# Patient Record
Sex: FEMALE | Race: OTHER | Employment: FULL TIME | URBAN - METROPOLITAN AREA
[De-identification: names, ages, dates, MRNs, and addresses within clinical notes are randomized per-mention and may not be internally consistent; named-entity substitution may affect disease eponyms.]

---

## 2018-03-20 ENCOUNTER — OFFICE VISIT (OUTPATIENT)
Dept: FAMILY MEDICINE CLINIC | Facility: CLINIC | Age: 28
End: 2018-03-20

## 2018-03-20 VITALS
BODY MASS INDEX: 26.22 KG/M2 | WEIGHT: 148 LBS | TEMPERATURE: 98 F | HEART RATE: 84 BPM | HEIGHT: 63 IN | RESPIRATION RATE: 12 BRPM | DIASTOLIC BLOOD PRESSURE: 70 MMHG | SYSTOLIC BLOOD PRESSURE: 102 MMHG | OXYGEN SATURATION: 98 %

## 2018-03-20 DIAGNOSIS — A08.4 VIRAL GASTROENTERITIS: Primary | ICD-10-CM

## 2018-03-20 DIAGNOSIS — J06.9 VIRAL URI: ICD-10-CM

## 2018-03-20 PROCEDURE — 99202 OFFICE O/P NEW SF 15 MIN: CPT | Performed by: NURSE PRACTITIONER

## 2018-03-20 NOTE — PATIENT INSTRUCTIONS
· May take Pepto-bismol or Imodium or Lomotil to slow diarrhea and minimize fluid loss through colon. · Rest as much as possible  · Try to drink lots of fluids. Start with small sips of water every 15 minutes as long as you can keep fluid down.     · Avoi stools  The danger from repeated diarrhea is dehydration.  Dehydration is the loss of too much water and other fluids from the body without taking in enough to replace what is lost.  Antibiotics are not effective in this illness, but there are a number of t good choice. They have too much sugar and not enough electrolytes. In this case, commercially available products called oral rehydration solutions are best.  · Caffeine, tobacco, and alcohol can make the diarrhea, cramping, and pain worse.   · Do not force to seek medical advice  Call your healthcare provider right away if any of the following occur:  · Increasing abdominal pain or constant lower right abdominal pain  · Continued vomiting (unable to keep liquids down)  · Frequent diarrhea (more than 5 times

## 2018-03-20 NOTE — PROGRESS NOTES
CHIEF COMPLAINT:   Patient presents with:  Cough  Diarrhea  Nausea      HPI:   Mahamed Lopez is a 29year old female who presents for complaints of nausea and diarrhea for 2 days. Reports generalized mild cramping abdominal pain.   Reports no vomiting, /70 (BP Location: Right arm, Patient Position: Sitting, Cuff Size: adult)   Pulse 84   Temp 98.4 °F (36.9 °C) (Oral)   Resp 12   Ht 63\"   Wt 148 lb   LMP 02/20/2018 (Exact Date)   SpO2 98%   BMI 26.22 kg/m²   GENERAL: well developed, well nourished, · Try to drink lots of fluids. Start with small sips of water every 15 minutes as long as you can keep fluid down. · Avoid fruit juices and soda. Try water, Pedialyte, and/or 1/2 strength Gatorade/Power Aid. Drink 1-2 oz. Every hour when tolerated. The danger from repeated diarrhea is dehydration.  Dehydration is the loss of too much water and other fluids from the body without taking in enough to replace what is lost.  Antibiotics are not effective in this illness, but there are a number of things yo · Water and clear liquids are important so you do not get dehydrated. Drink small amounts at a time, do not guzzle it down. If you are very dehydrated, sports drinks aren't a good choice. They have too much sugar and not enough electrolytes.  In this case, Follow up with your healthcare provider, or as advised. Call if you are not improving within 24 hours or if the diarrhea lasts more than one week. If a stool (diarrhea) sample was taken, you may call in 2 days (or as directed) for the results.   When to see

## 2019-07-15 ENCOUNTER — OFFICE VISIT (OUTPATIENT)
Dept: FAMILY MEDICINE CLINIC | Facility: CLINIC | Age: 29
End: 2019-07-15
Payer: COMMERCIAL

## 2019-07-15 VITALS
BODY MASS INDEX: 28.71 KG/M2 | RESPIRATION RATE: 18 BRPM | TEMPERATURE: 100 F | OXYGEN SATURATION: 99 % | SYSTOLIC BLOOD PRESSURE: 100 MMHG | DIASTOLIC BLOOD PRESSURE: 70 MMHG | HEIGHT: 62 IN | WEIGHT: 156 LBS | HEART RATE: 86 BPM

## 2019-07-15 DIAGNOSIS — J06.9 VIRAL URI WITH COUGH: Primary | ICD-10-CM

## 2019-07-15 LAB — CONTROL LINE PRESENT WITH A CLEAR BACKGROUND (YES/NO): YES YES/NO

## 2019-07-15 PROCEDURE — 87880 STREP A ASSAY W/OPTIC: CPT | Performed by: NURSE PRACTITIONER

## 2019-07-15 PROCEDURE — 99213 OFFICE O/P EST LOW 20 MIN: CPT | Performed by: NURSE PRACTITIONER

## 2019-07-15 RX ORDER — BROMPHENIRAMINE MALEATE, PSEUDOEPHEDRINE HYDROCHLORIDE, AND DEXTROMETHORPHAN HYDROBROMIDE 2; 30; 10 MG/5ML; MG/5ML; MG/5ML
10 SYRUP ORAL 4 TIMES DAILY PRN
Qty: 120 ML | Refills: 0 | Status: SHIPPED | OUTPATIENT
Start: 2019-07-15 | End: 2019-10-03 | Stop reason: ALTCHOICE

## 2019-07-15 NOTE — PROGRESS NOTES
CHIEF COMPLAINT:   Patient presents with:  URI      HPI:   Mary Anne Sy is a 34year old female who presents for uri symptoms for  4 days. Patient reports sore throat, congestion, fever with Tmax to 102, dry cough.  Symptoms have been constant since onset NOSE: Nostrils patent, clear nasal discharge, nasal mucosa red   THROAT: Oral mucosa pink, moist. Posterior pharynx is mildly erythematous. no exudates. Tonsils 1/4. NECK: Supple, non-tender.   LUNGS: clear to auscultation bilaterally, no wheezes or rhon · Use humidified air, steamy showers/baths and use vaporizer in sleeping quarters to keep secretions thin. Symptom management:    · Sore throat:  Cepacol lozenges or Chloroseptic throat spray (active ingredient Benzocaine).     Follow up with your PCP in 1 · Your appetite may be poor, so a light diet is fine. Stay well hydrated by drinking 6 to 8 glasses of fluids per day (water, soft drinks, juices, tea, or soup). Extra fluids will help loosen secretions in the nose and lungs.   · Over-the-counter cold medic · Take your temperature several times a day. If your fever is 100.4°F (38.0°C) for more than a day, call your healthcare provider. · Relax, lie down. Go to bed if you want.  Just get off your feet and rest. Also, drink plenty of fluids to avoid dehydration · Fever of 100.4°F  (38.0°C) or higher, or fever that doesn't go down with medicine  · Sudden dizziness or confusion  · Severe or continued vomiting  · Signs of dehydration, including extreme thirst, dark urine, infrequent urination, dry mouth  · Spotted,

## 2019-07-15 NOTE — PATIENT INSTRUCTIONS
Comfort measures:  · Hydrate! (cold or hot based on comfort). Drink lots of water or other non dehydrating liquids to help with illness. Salty foods, soups and tea can help with throat pain.    · Hand washing-use hand  or wash hands frequentl · You may use acetaminophen or ibuprofen to control pain and fever, unless another medicine was prescribed. If you have chronic liver or kidney disease, have ever had a stomach ulcer or gastrointestinal bleeding, or are taking blood-thinning medicines, cathryn © 4483-7828 [address]. [address] [facility] [place], [address] [employer] [place]. All rights reserved. This information is not intended as a substitute for professional medical care. Always follow your healthcare professional's instructions.           Adult · As your appetite returns, you can resume your normal diet. Ask your healthcare provider if there are any foods you should avoid.   When to seek medical care  When you first notice symptoms, ask your healthcare provider if antiviral medicines are appropria

## 2019-10-03 ENCOUNTER — OFFICE VISIT (OUTPATIENT)
Dept: FAMILY MEDICINE CLINIC | Facility: CLINIC | Age: 29
End: 2019-10-03
Payer: COMMERCIAL

## 2019-10-03 VITALS
WEIGHT: 162 LBS | HEART RATE: 77 BPM | OXYGEN SATURATION: 99 % | DIASTOLIC BLOOD PRESSURE: 65 MMHG | RESPIRATION RATE: 16 BRPM | SYSTOLIC BLOOD PRESSURE: 99 MMHG | HEIGHT: 62 IN | TEMPERATURE: 98 F | BODY MASS INDEX: 29.81 KG/M2

## 2019-10-03 DIAGNOSIS — R10.84 GENERALIZED ABDOMINAL DISCOMFORT: ICD-10-CM

## 2019-10-03 DIAGNOSIS — R39.9 URINARY SYMPTOM OR SIGN: Primary | ICD-10-CM

## 2019-10-03 PROCEDURE — 99213 OFFICE O/P EST LOW 20 MIN: CPT | Performed by: NURSE PRACTITIONER

## 2019-10-03 PROCEDURE — 87086 URINE CULTURE/COLONY COUNT: CPT | Performed by: NURSE PRACTITIONER

## 2019-10-03 PROCEDURE — 81003 URINALYSIS AUTO W/O SCOPE: CPT | Performed by: NURSE PRACTITIONER

## 2019-10-03 NOTE — PATIENT INSTRUCTIONS
If a urine culture was sent out, we will contact you with the results in 48-72 hours via phone or [employer]. If positive, then we will call in an appropriate antibiotic or change antibiotic if needed.  If negative, then you should stop antibiotics as it i discharge or other symptoms. Unknown Causes of Abdominal Pain (Female)    The exact cause of your belly (abdominal) pain is not clear. This does not mean that this is something to worry about. Everyone likes to know the exact cause of the problem.  B sugary drinks as this can make things worse. Take liquids in small amounts. Don’t guzzle them. · Caffeine sometimes makes the pain and cramping worse. · Don’t take dairy products if you have vomiting or diarrhea. · Don't eat large amounts at a time.  Clementina Cheung out of the body. In a woman, the urethra is the opening above the vagina. In men, the urethra is the opening on the tip of the penis. Dysuria is the feeling of pain or burning in the urethra when passing urine.   Dysuria can be caused by anything that irrit partner(s) have finished all antibiotics and your healthcare provider says you are no longer contagious. · Learn about and use safe sex practices. The safest sex is with a partner who has tested negative and only has sex with you.  Condoms can prevent STDs

## 2019-10-03 NOTE — PROGRESS NOTES
CHIEF COMPLAINT:   Patient presents with:  Urinary        HPI:   Long Elliott is a 34year old female presents with symptoms of UTI.  Complaining of urinary frequency, urgency, bloating/cramping lower abdomen (like her period), slight low back pain for la EYES: conjunctiva clear, EOM intact, no icterus. CARDIO: RRR, no murmurs  LUNGS: clear to ausculation bilaterally, no wheezing or rhonchi  GI: BS present x 4. Abd soft.  + generalized tenderness. No guarding. No rebound tenderness.  No hepatosplenomegaly If a urine culture was sent out, we will contact you with the results in 48-72 hours via phone or [employer]. If positive, then we will call in an appropriate antibiotic or change antibiotic if needed.  If negative, then you should stop antibiotics as it is no -Schedule appt with PCP or gyne if symptoms persist after completion of antibiotics,  if negative urine culture, if there is possibility of STD, if vaginal/penile discharge or other symptoms.         Unknown Causes of Abdominal Pain (Female)    The exact ca · Don’t force yourself to eat, especially if having cramps, vomiting, or diarrhea. · Water is important so you don't get dehydrated. Soup may also be good. Sports drinks may also help, especially if they are not too acidic.  Don't drink sugary drinks as th © 8116-4898 [address]. [address] [facility] [place], [facility] [address] [place]. All rights reserved. This information is not intended as a substitute for professional medical care. Always follow your healthcare professional's instructions.         Dysuria · If a culture was taken, don't have sex until you have been told that it is negative. This means you don't have an infection. Then follow your healthcare provider's advice to treat your condition.   If a culture was done and it is positive:  · Both you and

## 2019-10-23 ENCOUNTER — TELEPHONE (OUTPATIENT)
Dept: FAMILY MEDICINE CLINIC | Facility: CLINIC | Age: 29
End: 2019-10-23

## 2019-10-23 ENCOUNTER — OFFICE VISIT (OUTPATIENT)
Dept: FAMILY MEDICINE CLINIC | Facility: CLINIC | Age: 29
End: 2019-10-23
Payer: COMMERCIAL

## 2019-10-23 VITALS
HEIGHT: 62 IN | OXYGEN SATURATION: 98 % | SYSTOLIC BLOOD PRESSURE: 100 MMHG | HEART RATE: 67 BPM | DIASTOLIC BLOOD PRESSURE: 70 MMHG | BODY MASS INDEX: 30.18 KG/M2 | WEIGHT: 164 LBS

## 2019-10-23 DIAGNOSIS — R10.30 LOWER ABDOMINAL PAIN: ICD-10-CM

## 2019-10-23 DIAGNOSIS — Z00.00 HEALTHCARE MAINTENANCE: ICD-10-CM

## 2019-10-23 DIAGNOSIS — Z00.00 WELLNESS EXAMINATION: Primary | ICD-10-CM

## 2019-10-23 DIAGNOSIS — R53.82 CHRONIC FATIGUE: ICD-10-CM

## 2019-10-23 PROCEDURE — 99395 PREV VISIT EST AGE 18-39: CPT | Performed by: FAMILY MEDICINE

## 2019-10-23 PROCEDURE — 99203 OFFICE O/P NEW LOW 30 MIN: CPT | Performed by: FAMILY MEDICINE

## 2019-10-23 RX ORDER — DICYCLOMINE HYDROCHLORIDE 10 MG/1
10 CAPSULE ORAL 3 TIMES DAILY PRN
Qty: 15 CAPSULE | Refills: 0 | Status: SHIPPED | OUTPATIENT
Start: 2019-10-23 | End: 2021-02-17

## 2019-10-23 NOTE — PROGRESS NOTES
CC: Annual Physical Exam    HPI:   Arabella Maciel is a 34year old female who presents for a complete physical exam.    HCM  -Diet:  Well-balanced.   -Exercise regularly  -Mental Health: denies any depression or anxiety sx  -Skin care:  no concerning lesio Take 1 capsule (10 mg total) by mouth 3 (three) times daily as needed (abdominal cramps). , Disp: 15 capsule, Rfl: 0       No Known Allergies   History reviewed. No pertinent past medical history. History reviewed. No pertinent surgical history.    Family Normal rate, regular rhythm and normal heart sounds. No murmur heard. Edema not present. Pulmonary/Chest: Effort normal and breath sounds normal. No respiratory distress. She has no wheezes. She has no rales. Abdominal: Soft.  Bowel sounds are rose pain  -     COMP METABOLIC PANEL (14); Future  -     LIPASE; Future  -     Dicyclomine HCl 10 MG Oral Cap; Take 1 capsule (10 mg total) by mouth 3 (three) times daily as needed (abdominal cramps).   -suspect IBS given chronicity  -had pelvic U/S wnl w/ gyne

## 2019-10-23 NOTE — TELEPHONE ENCOUNTER
Faxed over Medical Release form to [employer] in [facility] at fax # [phone]. Requesting most recent Pap Results.

## 2019-10-27 ENCOUNTER — APPOINTMENT (OUTPATIENT)
Dept: LAB | Facility: HOSPITAL | Age: 29
End: 2019-10-27
Attending: FAMILY MEDICINE
Payer: COMMERCIAL

## 2019-10-27 DIAGNOSIS — R53.82 CHRONIC FATIGUE: ICD-10-CM

## 2019-10-27 DIAGNOSIS — Z00.00 HEALTHCARE MAINTENANCE: ICD-10-CM

## 2019-10-27 DIAGNOSIS — R10.30 LOWER ABDOMINAL PAIN: ICD-10-CM

## 2019-10-27 PROCEDURE — 84443 ASSAY THYROID STIM HORMONE: CPT

## 2019-10-27 PROCEDURE — 85027 COMPLETE CBC AUTOMATED: CPT

## 2019-10-27 PROCEDURE — 83690 ASSAY OF LIPASE: CPT

## 2019-10-27 PROCEDURE — 80061 LIPID PANEL: CPT

## 2019-10-27 PROCEDURE — 83036 HEMOGLOBIN GLYCOSYLATED A1C: CPT

## 2019-10-27 PROCEDURE — 81001 URINALYSIS AUTO W/SCOPE: CPT

## 2019-10-27 PROCEDURE — 82607 VITAMIN B-12: CPT

## 2019-10-27 PROCEDURE — 80053 COMPREHEN METABOLIC PANEL: CPT

## 2019-10-27 PROCEDURE — 82306 VITAMIN D 25 HYDROXY: CPT

## 2019-10-27 PROCEDURE — 36415 COLL VENOUS BLD VENIPUNCTURE: CPT

## 2019-11-02 RX ORDER — ERGOCALCIFEROL 1.25 MG/1
50000 CAPSULE ORAL WEEKLY
Qty: 12 CAPSULE | Refills: 0 | Status: SHIPPED | OUTPATIENT
Start: 2019-11-02 | End: 2020-01-19

## 2019-11-04 ENCOUNTER — TELEPHONE (OUTPATIENT)
Dept: FAMILY MEDICINE CLINIC | Facility: CLINIC | Age: 29
End: 2019-11-04

## 2019-11-04 NOTE — TELEPHONE ENCOUNTER
----- Message from Perry Askew MD sent at 11/2/2019  2:40 PM CDT -----  Please call patient and discuss results:  -low Vit D. This causes fatigue. Sent vitamin D supplements to pharmacy.   -no diabetes  -normal CMP, thyroid, B12 vitamin, CBC, p

## 2020-03-30 ENCOUNTER — OFFICE VISIT (OUTPATIENT)
Dept: FAMILY MEDICINE CLINIC | Facility: CLINIC | Age: 30
End: 2020-03-30
Payer: COMMERCIAL

## 2020-03-30 ENCOUNTER — APPOINTMENT (OUTPATIENT)
Dept: LAB | Facility: REFERENCE LAB | Age: 30
End: 2020-03-30
Attending: FAMILY MEDICINE
Payer: COMMERCIAL

## 2020-03-30 VITALS
HEART RATE: 86 BPM | SYSTOLIC BLOOD PRESSURE: 90 MMHG | HEIGHT: 62 IN | OXYGEN SATURATION: 96 % | BODY MASS INDEX: 30 KG/M2 | DIASTOLIC BLOOD PRESSURE: 60 MMHG | WEIGHT: 163 LBS

## 2020-03-30 DIAGNOSIS — R25.1 TREMOR: ICD-10-CM

## 2020-03-30 DIAGNOSIS — R25.1 TREMOR: Primary | ICD-10-CM

## 2020-03-30 DIAGNOSIS — Z30.42 ENCOUNTER FOR SURVEILLANCE OF INJECTABLE CONTRACEPTIVE: ICD-10-CM

## 2020-03-30 LAB
CONTROL LINE PRESENT WITH A CLEAR BACKGROUND (YES/NO): YES YES/NO
PREGNANCY TEST, URINE: NEGATIVE

## 2020-03-30 PROCEDURE — 82525 ASSAY OF COPPER: CPT

## 2020-03-30 PROCEDURE — 99214 OFFICE O/P EST MOD 30 MIN: CPT | Performed by: FAMILY MEDICINE

## 2020-03-30 PROCEDURE — 81025 URINE PREGNANCY TEST: CPT | Performed by: FAMILY MEDICINE

## 2020-03-30 PROCEDURE — 82390 ASSAY OF CERULOPLASMIN: CPT

## 2020-03-30 PROCEDURE — 36415 COLL VENOUS BLD VENIPUNCTURE: CPT

## 2020-03-30 PROCEDURE — 80053 COMPREHEN METABOLIC PANEL: CPT

## 2020-03-30 PROCEDURE — 96372 THER/PROPH/DIAG INJ SC/IM: CPT | Performed by: FAMILY MEDICINE

## 2020-03-30 PROCEDURE — 84443 ASSAY THYROID STIM HORMONE: CPT

## 2020-03-30 RX ORDER — MEDROXYPROGESTERONE ACETATE 150 MG/ML
150 INJECTION, SUSPENSION INTRAMUSCULAR
Qty: 1 VIAL | Refills: 1 | Status: SHIPPED | OUTPATIENT
Start: 2020-03-30 | End: 2021-02-17

## 2020-03-30 RX ADMIN — MEDROXYPROGESTERONE ACETATE 150 MG: 150 INJECTION, SUSPENSION INTRAMUSCULAR at 10:17:00

## 2020-03-30 NOTE — PATIENT INSTRUCTIONS
Nexplanon INSURANCE COVERAGE VERIFICATION  Provide them with the J code for Nexplanon: [id number] and the Current Procedural Terminology (CPT) code for Nexplanon placement: 25250  After verification, if you desire to proceed with placement then call the office

## 2020-03-31 RX ORDER — MEDROXYPROGESTERONE ACETATE 150 MG/ML
150 INJECTION, SUSPENSION INTRAMUSCULAR ONCE
Status: COMPLETED | OUTPATIENT
Start: 2020-03-31 | End: 2020-03-30

## 2020-03-31 NOTE — PROGRESS NOTES
HPI:   Patient presents with:  Contraception      Mahamed Lopez is a 27year old female presenting for:    Wants to start depo.  Considering nexplanon    Tremors  -for multiple months   -tremors are at rest but also occur in action such as writing  -gett surgical history on file.   No Known Allergies   Social History:  Social History    Tobacco Use      Smoking status: Never Smoker      Smokeless tobacco: Never Used    Alcohol use: No    Drug use: No     Family History:  Family History   Problem Relation Ag rigidity ). No cranial nerve deficit or sensory deficit. She displays a negative Romberg sign. Gait normal.   Skin: No rash noted.            ASSESSMENT AND PLAN:   Patient is a 27year old female who presents primarily presents for:    Diagnoses and all or [E]      Comp Metabolic Panel (14) [E]      Copper, Serum or Plasma      Ceruloplasmin [E]      POC Urine pregnancy test [81500]      Imaging & Consults:  None    Health Maintenance:  Annual Physical due on 10/23/2020  Pap Smear,3 Years due on 02/05/2021

## 2020-04-02 ENCOUNTER — TELEPHONE (OUTPATIENT)
Dept: FAMILY MEDICINE CLINIC | Facility: CLINIC | Age: 30
End: 2020-04-02

## 2020-04-02 DIAGNOSIS — R25.1 TREMORS OF NERVOUS SYSTEM: Primary | ICD-10-CM

## 2020-04-02 NOTE — TELEPHONE ENCOUNTER
Results were discussed, and she verbalized understanding. Referral entered for her to see Dr Ch Search and info was provided.

## 2020-04-02 NOTE — TELEPHONE ENCOUNTER
----- Message from Krystina Rosas MD sent at 4/2/2020  4:17 PM CDT -----  Please let him know all labs are normal. I'm sending to neurology for further work-up for tremors.  Please place referral to neuro

## 2020-04-14 ENCOUNTER — APPOINTMENT (OUTPATIENT)
Dept: GENERAL RADIOLOGY | Facility: HOSPITAL | Age: 30
End: 2020-04-14
Attending: EMERGENCY MEDICINE
Payer: COMMERCIAL

## 2020-04-14 ENCOUNTER — HOSPITAL ENCOUNTER (EMERGENCY)
Facility: HOSPITAL | Age: 30
Discharge: HOME | End: 2020-04-14
Attending: EMERGENCY MEDICINE
Payer: COMMERCIAL

## 2020-04-14 VITALS — TEMPERATURE: 99 F

## 2020-04-14 DIAGNOSIS — B34.9 VIRAL SYNDROME: Primary | ICD-10-CM

## 2020-04-14 PROCEDURE — 93010 ELECTROCARDIOGRAM REPORT: CPT | Performed by: EMERGENCY MEDICINE

## 2020-04-14 PROCEDURE — 93005 ELECTROCARDIOGRAM TRACING: CPT

## 2020-04-14 PROCEDURE — 71045 X-RAY EXAM CHEST 1 VIEW: CPT | Performed by: EMERGENCY MEDICINE

## 2020-04-14 PROCEDURE — 99284 EMERGENCY DEPT VISIT MOD MDM: CPT

## 2020-04-14 RX ORDER — ACETAMINOPHEN 500 MG
1000 TABLET ORAL ONCE
Status: COMPLETED | OUTPATIENT
Start: 2020-04-14 | End: 2020-04-14

## 2020-04-14 NOTE — ED INITIAL ASSESSMENT (HPI)
Pt arrived via medics to rm 33 for complaint of body aches, sob , chills and runny nose  Denies cough

## 2020-04-14 NOTE — ED PROVIDER NOTES
Patient Seen in: [facility] AND [facility] [place] [facility]      History   No chief complaint on file. Stated Complaint: SOB, Covid exposure    HPI    26-year-old female with no medical history presenting for evaluation of body aches and runny nose.   Her Rate and Rhythm: Normal rate and regular rhythm. Heart sounds: Normal heart sounds. Pulmonary:      Effort: Pulmonary effort is normal. No respiratory distress. Breath sounds: Normal breath sounds. No wheezing.    Abdominal:      General: There Personal protective equipment including gown, eye shield, surgical mask, and gloves were worn throughout the duration of the exam.  Handwashing was performed prior to and after the exam.  Any equipment used was cleaned with super sani-cloth germicidal wipe

## 2020-11-19 ENCOUNTER — TELEPHONE (OUTPATIENT)
Dept: FAMILY MEDICINE CLINIC | Facility: CLINIC | Age: 30
End: 2020-11-19

## 2020-11-19 NOTE — TELEPHONE ENCOUNTER
Pt called stating that 3 days ago she started with sore throat, nausea, runny nose and feels tired.   Pt is not taking any medications and has no other symptoms  Pt wants to be tested for covid  Please call back and advise

## 2020-11-19 NOTE — TELEPHONE ENCOUNTER
Patient will be getting tested for Covid today through her employer but will possibly call again tomorrow in case her results are negative because she might need something for her sore throat.

## 2020-12-24 ENCOUNTER — TELEPHONE (OUTPATIENT)
Dept: FAMILY MEDICINE CLINIC | Facility: CLINIC | Age: 30
End: 2020-12-24

## 2020-12-24 NOTE — TELEPHONE ENCOUNTER
Pt called stating that her sore throat is back and she also has really bad headaches  Pt wants to know what the doctor recommends   Please call back and advise

## 2020-12-28 NOTE — TELEPHONE ENCOUNTER
Called and left detailed voicemail. Apologized for the delay as office was closed for the holiday. Following up on her symptoms/condition update.   For further questions/concerns to call office back and/or if symptoms persist, to make virtual appointment

## 2020-12-30 ENCOUNTER — OFFICE VISIT (OUTPATIENT)
Dept: OTHER | Facility: HOSPITAL | Age: 30
End: 2020-12-30
Attending: EMERGENCY MEDICINE

## 2020-12-30 DIAGNOSIS — Z77.21 EXPOSURE TO BLOOD: Primary | ICD-10-CM

## 2020-12-30 LAB
HBV SURFACE AB SER QL: REACTIVE
HBV SURFACE AB SERPL IA-ACNC: 483.32 MIU/ML
HCV AB SERPL QL IA: NONREACTIVE

## 2020-12-30 PROCEDURE — 86803 HEPATITIS C AB TEST: CPT

## 2020-12-30 PROCEDURE — 87389 HIV-1 AG W/HIV-1&-2 AB AG IA: CPT

## 2020-12-30 PROCEDURE — 86706 HEP B SURFACE ANTIBODY: CPT

## 2021-02-16 ENCOUNTER — OFFICE VISIT (OUTPATIENT)
Dept: OTHER | Facility: HOSPITAL | Age: 31
End: 2021-02-16
Attending: EMERGENCY MEDICINE

## 2021-02-16 DIAGNOSIS — Z77.21 PERSONAL HISTORY OF EXPOSURE TO POTENTIALLY HAZARDOUS BODY FLUIDS: Primary | ICD-10-CM

## 2021-02-16 LAB
HBV SURFACE AB SER QL: REACTIVE
HBV SURFACE AB SERPL IA-ACNC: 481.9 MIU/ML
HCV AB SERPL QL IA: NONREACTIVE

## 2021-02-16 PROCEDURE — 86706 HEP B SURFACE ANTIBODY: CPT

## 2021-02-16 PROCEDURE — 87389 HIV-1 AG W/HIV-1&-2 AB AG IA: CPT

## 2021-02-16 PROCEDURE — 86803 HEPATITIS C AB TEST: CPT

## 2021-02-17 ENCOUNTER — OFFICE VISIT (OUTPATIENT)
Dept: FAMILY MEDICINE CLINIC | Facility: CLINIC | Age: 31
End: 2021-02-17
Payer: COMMERCIAL

## 2021-02-17 VITALS
BODY MASS INDEX: 31.28 KG/M2 | DIASTOLIC BLOOD PRESSURE: 64 MMHG | HEIGHT: 62 IN | SYSTOLIC BLOOD PRESSURE: 110 MMHG | WEIGHT: 170 LBS | HEART RATE: 70 BPM | OXYGEN SATURATION: 100 %

## 2021-02-17 DIAGNOSIS — M25.562 CHRONIC PAIN OF LEFT KNEE: ICD-10-CM

## 2021-02-17 DIAGNOSIS — G89.29 CHRONIC PAIN OF LEFT KNEE: ICD-10-CM

## 2021-02-17 DIAGNOSIS — G43.709 CHRONIC MIGRAINE WITHOUT AURA WITHOUT STATUS MIGRAINOSUS, NOT INTRACTABLE: Primary | ICD-10-CM

## 2021-02-17 PROCEDURE — 99215 OFFICE O/P EST HI 40 MIN: CPT | Performed by: FAMILY MEDICINE

## 2021-02-17 PROCEDURE — 3074F SYST BP LT 130 MM HG: CPT | Performed by: FAMILY MEDICINE

## 2021-02-17 PROCEDURE — 3008F BODY MASS INDEX DOCD: CPT | Performed by: FAMILY MEDICINE

## 2021-02-17 PROCEDURE — 3078F DIAST BP <80 MM HG: CPT | Performed by: FAMILY MEDICINE

## 2021-02-17 RX ORDER — SUMATRIPTAN 50 MG/1
50 TABLET, FILM COATED ORAL EVERY 2 HOUR PRN
Qty: 15 TABLET | Refills: 0 | Status: SHIPPED | OUTPATIENT
Start: 2021-02-17 | End: 2021-03-22

## 2021-02-17 RX ORDER — IBUPROFEN 800 MG/1
800 TABLET ORAL EVERY 6 HOURS PRN
Qty: 60 TABLET | Refills: 1 | Status: SHIPPED | OUTPATIENT
Start: 2021-02-17 | End: 2021-08-24 | Stop reason: ALTCHOICE

## 2021-02-17 NOTE — PROGRESS NOTES
HPI:   Patient presents with:  Knee Pain: left knee pain x3 years  Headache: frequent headaches x16 years      Florentino Corley is a 32year old female presenting for:      Headaches  -for 16 yrs  -during her teenage years had a normal CT scan  -mostly lef CREATSERUM 0.84 03/30/2020 10:44 AM    CA 9.0 03/30/2020 10:44 AM    ALB 3.8 03/30/2020 10:44 AM    TP 7.5 03/30/2020 10:44 AM    ALKPHO 63 03/30/2020 10:44 AM    AST 17 03/30/2020 10:44 AM    ALT 31 03/30/2020 10:44 AM    BILT 0.3 03/30/2020 10:44 AM    T Vitals reviewed. Constitutional: She appears well-developed. No distress. Eyes: Pupils are equal, round, and reactive to light. Conjunctivae are normal.   Cardiovascular: Normal rate, regular rhythm and normal heart sounds. No murmur heard.    Urbano treatments . Red flags/ ER precautions discussed.     Spent 40 minutes including chart review, reviewing appropriate medical history, evaluating patient, discussing treatment options, counseling and education, ordering appropriate diagnostic tests and comp

## 2021-03-05 ENCOUNTER — HOSPITAL ENCOUNTER (OUTPATIENT)
Dept: MRI IMAGING | Age: 31
Discharge: HOME | End: 2021-03-05
Attending: FAMILY MEDICINE
Payer: COMMERCIAL

## 2021-03-05 DIAGNOSIS — M25.562 CHRONIC PAIN OF LEFT KNEE: ICD-10-CM

## 2021-03-05 DIAGNOSIS — G89.29 CHRONIC PAIN OF LEFT KNEE: ICD-10-CM

## 2021-03-05 PROCEDURE — 73721 MRI JNT OF LWR EXTRE W/O DYE: CPT | Performed by: FAMILY MEDICINE

## 2021-03-08 ENCOUNTER — TELEPHONE (OUTPATIENT)
Dept: FAMILY MEDICINE CLINIC | Facility: CLINIC | Age: 31
End: 2021-03-08

## 2021-03-08 NOTE — TELEPHONE ENCOUNTER
----- Message from Floridalma Gatica MD sent at 3/5/2021  2:11 PM CST -----  Please let her know normal MRI of knee.  If continued pain we can send to ortho

## 2021-03-09 NOTE — TELEPHONE ENCOUNTER
----- Message from Keshia Gilbert MD sent at 3/5/2021  2:11 PM CST -----  Please let her know normal MRI of knee.  If continued pain we can send to ortho

## 2021-03-22 DIAGNOSIS — G43.709 CHRONIC MIGRAINE WITHOUT AURA WITHOUT STATUS MIGRAINOSUS, NOT INTRACTABLE: ICD-10-CM

## 2021-03-25 RX ORDER — SUMATRIPTAN 50 MG/1
50 TABLET, FILM COATED ORAL EVERY 2 HOUR PRN
Qty: 15 TABLET | Refills: 0 | Status: SHIPPED | OUTPATIENT
Start: 2021-03-25 | End: 2021-11-17

## 2021-08-19 ENCOUNTER — TELEPHONE (OUTPATIENT)
Dept: FAMILY MEDICINE CLINIC | Facility: CLINIC | Age: 31
End: 2021-08-19

## 2021-08-19 NOTE — TELEPHONE ENCOUNTER
Pt called stating that for the past 2 days she has been having bad migraines, nausea, stomach is upset and feels tired  Pt wants to know what to do  Please call back and advise

## 2021-08-19 NOTE — TELEPHONE ENCOUNTER
Appt scheduled with Dr. Adams Carty. Next Appt:    With [address] [place] [address] Jose Noriega MD)  08/24/2021 at 10:00 AM

## 2021-08-24 ENCOUNTER — OFFICE VISIT (OUTPATIENT)
Dept: FAMILY MEDICINE CLINIC | Facility: CLINIC | Age: 31
End: 2021-08-24
Payer: COMMERCIAL

## 2021-08-24 VITALS
DIASTOLIC BLOOD PRESSURE: 64 MMHG | OXYGEN SATURATION: 100 % | HEART RATE: 85 BPM | HEIGHT: 62 IN | TEMPERATURE: 98 F | WEIGHT: 169 LBS | SYSTOLIC BLOOD PRESSURE: 90 MMHG | BODY MASS INDEX: 31.1 KG/M2

## 2021-08-24 DIAGNOSIS — R25.1 TREMORS OF NERVOUS SYSTEM: ICD-10-CM

## 2021-08-24 DIAGNOSIS — R51.9 WORSENING HEADACHES: ICD-10-CM

## 2021-08-24 DIAGNOSIS — G43.801 OTHER MIGRAINE WITH STATUS MIGRAINOSUS, NOT INTRACTABLE: Primary | ICD-10-CM

## 2021-08-24 PROCEDURE — 3008F BODY MASS INDEX DOCD: CPT | Performed by: INTERNAL MEDICINE

## 2021-08-24 PROCEDURE — 3074F SYST BP LT 130 MM HG: CPT | Performed by: INTERNAL MEDICINE

## 2021-08-24 PROCEDURE — 3078F DIAST BP <80 MM HG: CPT | Performed by: INTERNAL MEDICINE

## 2021-08-24 PROCEDURE — 99214 OFFICE O/P EST MOD 30 MIN: CPT | Performed by: INTERNAL MEDICINE

## 2021-08-24 RX ORDER — TOPIRAMATE 25 MG/1
25 TABLET ORAL 2 TIMES DAILY
Qty: 90 TABLET | Refills: 0 | Status: SHIPPED | OUTPATIENT
Start: 2021-08-24 | End: 2021-11-17

## 2021-08-26 NOTE — PROGRESS NOTES
[facility] 8  Return Patient Progress Note      HPI:   Patient presents with:  Headache: meds not helping      Cal Minaya is a 32year old female presenting for: migraines    Has a significant  has no past medical history on file. tablet 0      PMH:  No past medical history on file. PSH:  No past surgical history on file.     Allergies:  No Known Allergies   Social History:  Social History    Tobacco Use      Smoking status: Never Smoker      Smokeless tobacco: Never Used    Alc 08/08/2021 (Exact Date)   SpO2 100%   BMI 30.91 kg/m²  Estimated body mass index is 30.91 kg/m² as calculated from the following:    Height as of this encounter: 5' 2\" (1.575 m). Weight as of this encounter: 169 lb (76.7 kg).    Wt Readings from Last 3 primarily presents for:    (G43.801) Other migraine with status migrainosus, not intractable  (primary encounter diagnosis)  Plan: MRI BRAIN/MRA BRAIN  (CPT=70551/81372), NEURO -        INTERNAL        (R51.9) Worsening headaches  Plan: MRI BRAIN/MRA BRAIN Gen Lind MD  Internal Medicine/Primary Care  [place] [address]

## 2021-11-05 ENCOUNTER — APPOINTMENT (OUTPATIENT)
Dept: ULTRASOUND IMAGING | Facility: HOSPITAL | Age: 31
End: 2021-11-05
Attending: EMERGENCY MEDICINE
Payer: COMMERCIAL

## 2021-11-05 ENCOUNTER — HOSPITAL ENCOUNTER (OUTPATIENT)
Age: 31
Discharge: ANOTHER HOSPITAL | End: 2021-11-05
Payer: COMMERCIAL

## 2021-11-05 ENCOUNTER — HOSPITAL ENCOUNTER (EMERGENCY)
Facility: HOSPITAL | Age: 31
Discharge: HOME | End: 2021-11-05
Attending: EMERGENCY MEDICINE
Payer: COMMERCIAL

## 2021-11-05 VITALS
TEMPERATURE: 97 F | DIASTOLIC BLOOD PRESSURE: 71 MMHG | WEIGHT: 170 LBS | RESPIRATION RATE: 17 BRPM | OXYGEN SATURATION: 98 % | SYSTOLIC BLOOD PRESSURE: 110 MMHG | HEART RATE: 80 BPM | BODY MASS INDEX: 31.28 KG/M2 | HEIGHT: 62 IN

## 2021-11-05 VITALS
WEIGHT: 170 LBS | BODY MASS INDEX: 31.28 KG/M2 | TEMPERATURE: 98 F | HEIGHT: 62 IN | OXYGEN SATURATION: 100 % | DIASTOLIC BLOOD PRESSURE: 57 MMHG | SYSTOLIC BLOOD PRESSURE: 110 MMHG | RESPIRATION RATE: 18 BRPM | HEART RATE: 79 BPM

## 2021-11-05 DIAGNOSIS — Z34.90 PREGNANCY: ICD-10-CM

## 2021-11-05 DIAGNOSIS — O26.891 ABDOMINAL PAIN DURING PREGNANCY IN FIRST TRIMESTER: Primary | ICD-10-CM

## 2021-11-05 DIAGNOSIS — R10.9 ABDOMINAL PAIN DURING PREGNANCY IN FIRST TRIMESTER: Primary | ICD-10-CM

## 2021-11-05 DIAGNOSIS — O41.8X10 SUBCHORIONIC HEMORRHAGE OF PLACENTA IN FIRST TRIMESTER, SINGLE OR UNSPECIFIED FETUS: ICD-10-CM

## 2021-11-05 DIAGNOSIS — Z34.91 NORMAL IUP (INTRAUTERINE PREGNANCY) ON PRENATAL ULTRASOUND, FIRST TRIMESTER: Primary | ICD-10-CM

## 2021-11-05 DIAGNOSIS — O46.8X1 SUBCHORIONIC HEMORRHAGE OF PLACENTA IN FIRST TRIMESTER, SINGLE OR UNSPECIFIED FETUS: ICD-10-CM

## 2021-11-05 PROCEDURE — 36415 COLL VENOUS BLD VENIPUNCTURE: CPT | Performed by: NURSE PRACTITIONER

## 2021-11-05 PROCEDURE — 76801 OB US < 14 WKS SINGLE FETUS: CPT | Performed by: EMERGENCY MEDICINE

## 2021-11-05 PROCEDURE — 85025 COMPLETE CBC W/AUTO DIFF WBC: CPT | Performed by: EMERGENCY MEDICINE

## 2021-11-05 PROCEDURE — 99215 OFFICE O/P EST HI 40 MIN: CPT | Performed by: NURSE PRACTITIONER

## 2021-11-05 PROCEDURE — 80048 BASIC METABOLIC PNL TOTAL CA: CPT | Performed by: EMERGENCY MEDICINE

## 2021-11-05 PROCEDURE — 99284 EMERGENCY DEPT VISIT MOD MDM: CPT

## 2021-11-05 PROCEDURE — 81025 URINE PREGNANCY TEST: CPT | Performed by: NURSE PRACTITIONER

## 2021-11-05 PROCEDURE — 81002 URINALYSIS NONAUTO W/O SCOPE: CPT | Performed by: NURSE PRACTITIONER

## 2021-11-05 PROCEDURE — 84702 CHORIONIC GONADOTROPIN TEST: CPT | Performed by: EMERGENCY MEDICINE

## 2021-11-05 PROCEDURE — 36415 COLL VENOUS BLD VENIPUNCTURE: CPT

## 2021-11-05 NOTE — ED INITIAL ASSESSMENT (HPI)
Patient presents to ER with complaints of abdominal cramping, believed to be about 7 weeks pregnant.  Sent from IC to r/o ectopic.

## 2021-11-05 NOTE — ED PROVIDER NOTES
Patient Seen in: [facility]      History   No chief complaint on file.     Stated Complaint: sharp abd pain    Subjective:   HPI    This is a well-appearing 20-year-old female who is a , currently 7 weeks 2 days based off of LMP who presen Oropharynx is clear. Uvula midline. Eyes:      General: Lids are normal.      Extraocular Movements: Extraocular movements intact. Conjunctiva/sclera: Conjunctivae normal.      Pupils: Pupils are equal, round, and reactive to light.    Cardiovascular Disposition: Ic to ed  11/5/2021 11:08 am    Follow-up:  No follow-up provider specified.         Medications Prescribed:  Current Discharge Medication List

## 2021-11-05 NOTE — ED QUICK NOTES
Assumed care to this pt , received report from  [address]  Per report given pt came in for for vaginal bleeding and abdominal cramping, pt is 7 weeks . Will continue to monitor pt. Awaiting for ultrasound report.

## 2021-11-05 NOTE — ED PROVIDER NOTES
Patient Seen in: [facility] AND [facility] Emergency Department      History   Patient presents with:  Pregnancy Issues    Stated Complaint: R/O ectopic, sent by IC    Subjective:   HPI    51-year-old healthy G3, P2 with history of 2 live births vaginally who st respiratory distress. Abdominal: Soft. No focal significant lower abdominal tenderness. Musculoskeletal: Normal range of motion. No edema or tenderness. Neurological: Alert and oriented to person, place, and time. Skin: Skin is warm and dry.    Nurs Disposition:  Discharge  11/5/2021  4:46 pm    Follow-up:  Nawaf Lopez MD  [address] [address]    Call      Juan Jay [address]  [phone]    Schedule

## 2021-12-22 ENCOUNTER — OFFICE VISIT (OUTPATIENT)
Dept: FAMILY MEDICINE CLINIC | Facility: CLINIC | Age: 31
End: 2021-12-22
Payer: COMMERCIAL

## 2021-12-22 VITALS
OXYGEN SATURATION: 98 % | HEIGHT: 62 IN | TEMPERATURE: 98 F | SYSTOLIC BLOOD PRESSURE: 94 MMHG | HEART RATE: 86 BPM | BODY MASS INDEX: 32.2 KG/M2 | WEIGHT: 175 LBS | DIASTOLIC BLOOD PRESSURE: 60 MMHG

## 2021-12-22 DIAGNOSIS — Z3A.14 14 WEEKS GESTATION OF PREGNANCY: ICD-10-CM

## 2021-12-22 DIAGNOSIS — Z00.00 WELLNESS EXAMINATION: Primary | ICD-10-CM

## 2021-12-22 PROCEDURE — 99395 PREV VISIT EST AGE 18-39: CPT | Performed by: FAMILY MEDICINE

## 2021-12-22 PROCEDURE — 3074F SYST BP LT 130 MM HG: CPT | Performed by: FAMILY MEDICINE

## 2021-12-22 PROCEDURE — 3008F BODY MASS INDEX DOCD: CPT | Performed by: FAMILY MEDICINE

## 2021-12-22 PROCEDURE — 3078F DIAST BP <80 MM HG: CPT | Performed by: FAMILY MEDICINE

## 2021-12-22 RX ORDER — ACETAMINOPHEN 500 MG
500 TABLET ORAL EVERY 6 HOURS PRN
COMMUNITY

## 2021-12-22 NOTE — PROGRESS NOTES
CC: Annual Physical Exam    HPI:   Ted Zamora is a 32year old female who presents for a complete physical exam.    HCM  -Diet:  Well-balanced.   -Exercise active  -Mental Health: denies any depression or anxiety sx  -Skin care:  no concerning lesions/ Risk     22Q11.2 DELETION SYNDROME RESULT TEXT Low Risk     22Q11.2 DELETION SYNDROME POPULATION-BASED RISK TEXT 1/2,000     22Q11.2 DELETION SYNDROME RISK SCORE TEXT 1/9,000     FOOTNOTES See Notes    ABORH (BLOOD TYPE)   Result Value Ref Range    ABO Yusra (pregnancy). Negative for fever. Respiratory: Negative for cough and shortness of breath. Cardiovascular: Negative for chest pain, palpitations and leg swelling. Gastrointestinal: Negative for abdominal pain, constipation, diarrhea and vomiting.    [place] Metabolic Panel (14)      Lipid Panel      Hemoglobin A1C      TSH W Reflex To Free T4      Diagnoses and all orders for this visit:    Wellness examination  -     CBC WITH DIFFERENTIAL WITH PLATELET; Future  -     COMP METABOLIC PANEL (14);  Future  -

## 2022-01-02 ENCOUNTER — HOSPITAL ENCOUNTER (OUTPATIENT)
Age: 32
Discharge: HOME | End: 2022-01-02
Payer: COMMERCIAL

## 2022-01-02 VITALS
OXYGEN SATURATION: 100 % | TEMPERATURE: 98 F | HEART RATE: 93 BPM | DIASTOLIC BLOOD PRESSURE: 69 MMHG | SYSTOLIC BLOOD PRESSURE: 116 MMHG | RESPIRATION RATE: 18 BRPM

## 2022-01-02 DIAGNOSIS — B34.9 VIRAL ILLNESS: ICD-10-CM

## 2022-01-02 DIAGNOSIS — Z20.822 ENCOUNTER FOR LABORATORY TESTING FOR COVID-19 VIRUS: Primary | ICD-10-CM

## 2022-01-02 LAB — S PYO AG THROAT QL: NEGATIVE

## 2022-01-02 PROCEDURE — 87880 STREP A ASSAY W/OPTIC: CPT | Performed by: NURSE PRACTITIONER

## 2022-01-02 PROCEDURE — 99213 OFFICE O/P EST LOW 20 MIN: CPT | Performed by: NURSE PRACTITIONER

## 2022-01-02 NOTE — ED INITIAL ASSESSMENT (HPI)
Pt came in due to sore throat and headache for the past 2 days. Pt denies any sob, cp, nvd, ha, fever, or any dizziness. Pt has easy non labored respirations. Pt is fully verbal and ambulatory.

## 2022-01-02 NOTE — ED PROVIDER NOTES
Patient Seen in: [facility]      History   Patient presents with:  Sore Throat    Stated Complaint: SORE THROAT, HEADACHE X 2 DAYS    Subjective:   HPI    31 Yo arrives to the ic with co sore throat, tolerating secretions, phonation normal, n Mucous membranes are moist.      Pharynx: Oropharynx is clear. Uvula midline. No oropharyngeal exudate, posterior oropharyngeal erythema or uvula swelling.    Eyes:      Conjunctiva/sclera: Conjunctivae normal.      Pupils: Pupils are equal, round, and reac explained that emergent conditions may arise and to go to the ER for new, worsening or any persistent conditions. I've explained the importance of following up with their doctor as instructed.  The patient verbalized understanding of the discharge instructi

## 2022-01-06 LAB — SARS-COV-2 RNA RESP QL NAA+PROBE: DETECTED

## 2022-01-11 ENCOUNTER — TELEMEDICINE (OUTPATIENT)
Dept: FAMILY MEDICINE CLINIC | Facility: CLINIC | Age: 32
End: 2022-01-11
Payer: COMMERCIAL

## 2022-01-11 DIAGNOSIS — R42 DIZZINESS: ICD-10-CM

## 2022-01-11 DIAGNOSIS — U07.1 LAB TEST POSITIVE FOR DETECTION OF COVID-19 VIRUS: Primary | ICD-10-CM

## 2022-01-11 DIAGNOSIS — Z3A.16 16 WEEKS GESTATION OF PREGNANCY: ICD-10-CM

## 2022-01-11 PROCEDURE — 99214 OFFICE O/P EST MOD 30 MIN: CPT | Performed by: INTERNAL MEDICINE

## 2022-01-14 NOTE — PROGRESS NOTES
[facility] 8  Virtual Note      HPI:     Jabari Noriega verbally consents to a Virtual Check-In visit on 1/11/22        Patient understands and accepts financial responsibility for any deductible, co-insurance and/or co-pays associated Date/Time    CHOLEST 182 10/27/2019 09:38 AM    HDL 44 10/27/2019 09:38 AM    TRIG 110 10/27/2019 09:38 AM     (H) 10/27/2019 09:38 AM    NONHDLC 138 (H) 10/27/2019 09:38 AM        Medications:  Current Outpatient Medications   Medication Sig Dispen up notes/labs for next visit          Reasurrance and education provided. All questions answered. Red flags/ ER precautions discussed. The patient understands and accepts the limitations of the use of telemedicine.   Please note that the following visit wa

## 2022-01-24 ENCOUNTER — TELEPHONE (OUTPATIENT)
Dept: INTERNAL MEDICINE CLINIC | Facility: CLINIC | Age: 32
End: 2022-01-24

## 2022-01-24 NOTE — TELEPHONE ENCOUNTER
Patient called in to Doctor on call. States had covid 3 weeks ago, felt better. Now with headaches up to 9/10 and a burning sensation in her nostrils. Patient is 18 weeks pregnant. Has not taken any medications, no other symptoms.    Advised to try ty

## 2022-09-20 ENCOUNTER — LAB ENCOUNTER (OUTPATIENT)
Dept: LAB | Facility: REFERENCE LAB | Age: 32
End: 2022-09-20
Attending: FAMILY MEDICINE

## 2022-09-20 LAB
ALBUMIN SERPL-MCNC: 4 G/DL (ref 3.4–5)
ALBUMIN/GLOB SERPL: 1.1 {RATIO} (ref 1–2)
ALP LIVER SERPL-CCNC: 93 U/L
ALT SERPL-CCNC: 29 U/L
ANION GAP SERPL CALC-SCNC: 6 MMOL/L (ref 0–18)
AST SERPL-CCNC: 19 U/L (ref 15–37)
BASOPHILS # BLD AUTO: 0.02 X10(3) UL (ref 0–0.2)
BASOPHILS NFR BLD AUTO: 0.2 %
BILIRUB SERPL-MCNC: 0.3 MG/DL (ref 0.1–2)
BUN BLD-MCNC: 16 MG/DL (ref 7–18)
BUN/CREAT SERPL: 16.2 (ref 10–20)
CALCIUM BLD-MCNC: 9.2 MG/DL (ref 8.5–10.1)
CHLORIDE SERPL-SCNC: 111 MMOL/L (ref 98–112)
CHOLEST SERPL-MCNC: 190 MG/DL (ref ?–200)
CO2 SERPL-SCNC: 26 MMOL/L (ref 21–32)
CREAT BLD-MCNC: 0.99 MG/DL
DEPRECATED RDW RBC AUTO: 49.5 FL (ref 35.1–46.3)
EOSINOPHIL # BLD AUTO: 0.07 X10(3) UL (ref 0–0.7)
EOSINOPHIL NFR BLD AUTO: 0.8 %
ERYTHROCYTE [DISTWIDTH] IN BLOOD BY AUTOMATED COUNT: 15 % (ref 11–15)
EST. AVERAGE GLUCOSE BLD GHB EST-MCNC: 111 MG/DL (ref 68–126)
FASTING PATIENT LIPID ANSWER: NO
FASTING STATUS PATIENT QL REPORTED: NO
GFR SERPLBLD BASED ON 1.73 SQ M-ARVRAT: 78 ML/MIN/1.73M2 (ref 60–?)
GLOBULIN PLAS-MCNC: 3.7 G/DL (ref 2.8–4.4)
GLUCOSE BLD-MCNC: 87 MG/DL (ref 70–99)
HBA1C MFR BLD: 5.5 % (ref ?–5.7)
HCT VFR BLD AUTO: 41.2 %
HDLC SERPL-MCNC: 43 MG/DL (ref 40–59)
HGB BLD-MCNC: 13.1 G/DL
IMM GRANULOCYTES # BLD AUTO: 0.02 X10(3) UL (ref 0–1)
IMM GRANULOCYTES NFR BLD: 0.2 %
LDLC SERPL CALC-MCNC: 101 MG/DL (ref ?–100)
LYMPHOCYTES # BLD AUTO: 1.9 X10(3) UL (ref 1–4)
LYMPHOCYTES NFR BLD AUTO: 22.8 %
MCH RBC QN AUTO: 28.6 PG (ref 26–34)
MCHC RBC AUTO-ENTMCNC: 31.8 G/DL (ref 31–37)
MCV RBC AUTO: 90 FL
MONOCYTES # BLD AUTO: 0.52 X10(3) UL (ref 0.1–1)
MONOCYTES NFR BLD AUTO: 6.2 %
NEUTROPHILS # BLD AUTO: 5.82 X10 (3) UL (ref 1.5–7.7)
NEUTROPHILS # BLD AUTO: 5.82 X10(3) UL (ref 1.5–7.7)
NEUTROPHILS NFR BLD AUTO: 69.8 %
NONHDLC SERPL-MCNC: 147 MG/DL (ref ?–130)
OSMOLALITY SERPL CALC.SUM OF ELEC: 297 MOSM/KG (ref 275–295)
PLATELET # BLD AUTO: 228 10(3)UL (ref 150–450)
POTASSIUM SERPL-SCNC: 3.9 MMOL/L (ref 3.5–5.1)
PROT SERPL-MCNC: 7.7 G/DL (ref 6.4–8.2)
RBC # BLD AUTO: 4.58 X10(6)UL
SODIUM SERPL-SCNC: 143 MMOL/L (ref 136–145)
TRIGL SERPL-MCNC: 271 MG/DL (ref 30–149)
TSI SER-ACNC: 1.42 MIU/ML (ref 0.36–3.74)
VLDLC SERPL CALC-MCNC: 46 MG/DL (ref 0–30)
WBC # BLD AUTO: 8.4 X10(3) UL (ref 4–11)

## 2022-09-20 PROCEDURE — 80053 COMPREHEN METABOLIC PANEL: CPT | Performed by: FAMILY MEDICINE

## 2022-09-20 PROCEDURE — 84443 ASSAY THYROID STIM HORMONE: CPT | Performed by: FAMILY MEDICINE

## 2022-09-20 PROCEDURE — 83036 HEMOGLOBIN GLYCOSYLATED A1C: CPT | Performed by: FAMILY MEDICINE

## 2022-09-20 PROCEDURE — 80061 LIPID PANEL: CPT | Performed by: FAMILY MEDICINE

## 2022-09-20 PROCEDURE — 36415 COLL VENOUS BLD VENIPUNCTURE: CPT | Performed by: FAMILY MEDICINE

## 2022-09-20 PROCEDURE — 85025 COMPLETE CBC W/AUTO DIFF WBC: CPT | Performed by: FAMILY MEDICINE

## 2023-01-11 ENCOUNTER — TELEPHONE (OUTPATIENT)
Dept: INTERNAL MEDICINE CLINIC | Facility: CLINIC | Age: 33
End: 2023-01-11

## 2023-01-11 NOTE — TELEPHONE ENCOUNTER
Pt called stating that since yesterday she has been having intense stomach pain, nausea, diarrhea, chills  Pt has taken tylenol for the pain  Pt wants for doctor to recommend medication or testing/appt if possible    Please advise

## 2023-01-11 NOTE — TELEPHONE ENCOUNTER
Patient has not been seen in the office for 2 yrs her last visit was telemedicine 01/11/2022. Patient needs to be seen and get tested for influenza/covid she can go to urgent care or walk in clinic.  Called patient, no answer, left message for patient to call back

## 2023-06-06 ENCOUNTER — OFFICE VISIT (OUTPATIENT)
Dept: INTERNAL MEDICINE CLINIC | Facility: CLINIC | Age: 33
End: 2023-06-06
Payer: COMMERCIAL

## 2023-06-06 VITALS
HEART RATE: 74 BPM | TEMPERATURE: 98 F | OXYGEN SATURATION: 98 % | SYSTOLIC BLOOD PRESSURE: 112 MMHG | DIASTOLIC BLOOD PRESSURE: 64 MMHG | BODY MASS INDEX: 30 KG/M2 | WEIGHT: 163 LBS

## 2023-06-06 DIAGNOSIS — Z00.00 HEALTHCARE MAINTENANCE: ICD-10-CM

## 2023-06-06 DIAGNOSIS — R10.84 GENERALIZED ABDOMINAL PAIN: Primary | ICD-10-CM

## 2023-06-06 PROCEDURE — 3078F DIAST BP <80 MM HG: CPT | Performed by: FAMILY MEDICINE

## 2023-06-06 PROCEDURE — 3074F SYST BP LT 130 MM HG: CPT | Performed by: FAMILY MEDICINE

## 2023-06-06 PROCEDURE — 99214 OFFICE O/P EST MOD 30 MIN: CPT | Performed by: FAMILY MEDICINE

## 2023-06-06 RX ORDER — FAMOTIDINE 20 MG/1
20 TABLET, FILM COATED ORAL 2 TIMES DAILY PRN
Qty: 60 TABLET | Refills: 0 | Status: SHIPPED | OUTPATIENT
Start: 2023-06-06

## 2023-06-06 RX ORDER — DICYCLOMINE HCL 20 MG
20 TABLET ORAL EVERY 6 HOURS PRN
Qty: 30 TABLET | Refills: 0 | Status: SHIPPED | OUTPATIENT
Start: 2023-06-06

## 2023-06-06 RX ORDER — AMITRIPTYLINE HYDROCHLORIDE 10 MG/1
10 TABLET, FILM COATED ORAL NIGHTLY
Qty: 90 TABLET | Refills: 0 | Status: SHIPPED | OUTPATIENT
Start: 2023-06-06

## 2023-06-08 ENCOUNTER — LAB ENCOUNTER (OUTPATIENT)
Dept: LAB | Facility: REFERENCE LAB | Age: 33
End: 2023-06-08
Attending: FAMILY MEDICINE
Payer: COMMERCIAL

## 2023-06-08 ENCOUNTER — HOSPITAL ENCOUNTER (OUTPATIENT)
Dept: GENERAL RADIOLOGY | Facility: HOSPITAL | Age: 33
Discharge: HOME | End: 2023-06-08
Attending: FAMILY MEDICINE
Payer: COMMERCIAL

## 2023-06-08 DIAGNOSIS — R10.84 GENERALIZED ABDOMINAL PAIN: ICD-10-CM

## 2023-06-08 LAB
ALBUMIN SERPL-MCNC: 3.6 G/DL (ref 3.4–5)
ALBUMIN/GLOB SERPL: 1 {RATIO} (ref 1–2)
ALP LIVER SERPL-CCNC: 75 U/L
ALT SERPL-CCNC: 80 U/L
ANION GAP SERPL CALC-SCNC: 6 MMOL/L (ref 0–18)
AST SERPL-CCNC: 62 U/L (ref 15–37)
BASOPHILS # BLD AUTO: 0.03 X10(3) UL (ref 0–0.2)
BASOPHILS NFR BLD AUTO: 0.5 %
BILIRUB SERPL-MCNC: 0.3 MG/DL (ref 0.1–2)
BUN BLD-MCNC: 14 MG/DL (ref 7–18)
BUN/CREAT SERPL: 23.3 (ref 10–20)
CALCIUM BLD-MCNC: 8.6 MG/DL (ref 8.5–10.1)
CHLORIDE SERPL-SCNC: 110 MMOL/L (ref 98–112)
CHOLEST SERPL-MCNC: 186 MG/DL (ref ?–200)
CO2 SERPL-SCNC: 24 MMOL/L (ref 21–32)
CREAT BLD-MCNC: 0.6 MG/DL
DEPRECATED RDW RBC AUTO: 45.7 FL (ref 35.1–46.3)
EOSINOPHIL # BLD AUTO: 0.09 X10(3) UL (ref 0–0.7)
EOSINOPHIL NFR BLD AUTO: 1.5 %
ERYTHROCYTE [DISTWIDTH] IN BLOOD BY AUTOMATED COUNT: 13.2 % (ref 11–15)
FASTING PATIENT LIPID ANSWER: YES
FASTING STATUS PATIENT QL REPORTED: YES
GFR SERPLBLD BASED ON 1.73 SQ M-ARVRAT: 121 ML/MIN/1.73M2 (ref 60–?)
GLOBULIN PLAS-MCNC: 3.5 G/DL (ref 2.8–4.4)
GLUCOSE BLD-MCNC: 96 MG/DL (ref 70–99)
HCT VFR BLD AUTO: 40 %
HDLC SERPL-MCNC: 46 MG/DL (ref 40–59)
HGB BLD-MCNC: 12.4 G/DL
IMM GRANULOCYTES # BLD AUTO: 0.02 X10(3) UL (ref 0–1)
IMM GRANULOCYTES NFR BLD: 0.3 %
LDLC SERPL CALC-MCNC: 123 MG/DL (ref ?–100)
LIPASE SERPL-CCNC: 43 U/L (ref 13–75)
LYMPHOCYTES # BLD AUTO: 2.12 X10(3) UL (ref 1–4)
LYMPHOCYTES NFR BLD AUTO: 34.2 %
MCH RBC QN AUTO: 29 PG (ref 26–34)
MCHC RBC AUTO-ENTMCNC: 31 G/DL (ref 31–37)
MCV RBC AUTO: 93.7 FL
MONOCYTES # BLD AUTO: 0.35 X10(3) UL (ref 0.1–1)
MONOCYTES NFR BLD AUTO: 5.6 %
NEUTROPHILS # BLD AUTO: 3.59 X10 (3) UL (ref 1.5–7.7)
NEUTROPHILS # BLD AUTO: 3.59 X10(3) UL (ref 1.5–7.7)
NEUTROPHILS NFR BLD AUTO: 57.9 %
NONHDLC SERPL-MCNC: 140 MG/DL (ref ?–130)
OSMOLALITY SERPL CALC.SUM OF ELEC: 290 MOSM/KG (ref 275–295)
PLATELET # BLD AUTO: 213 10(3)UL (ref 150–450)
POTASSIUM SERPL-SCNC: 3.9 MMOL/L (ref 3.5–5.1)
PROT SERPL-MCNC: 7.1 G/DL (ref 6.4–8.2)
RBC # BLD AUTO: 4.27 X10(6)UL
SODIUM SERPL-SCNC: 140 MMOL/L (ref 136–145)
TRIGL SERPL-MCNC: 95 MG/DL (ref 30–149)
VLDLC SERPL CALC-MCNC: 17 MG/DL (ref 0–30)
WBC # BLD AUTO: 6.2 X10(3) UL (ref 4–11)

## 2023-06-08 PROCEDURE — 85025 COMPLETE CBC W/AUTO DIFF WBC: CPT | Performed by: FAMILY MEDICINE

## 2023-06-08 PROCEDURE — 83690 ASSAY OF LIPASE: CPT | Performed by: FAMILY MEDICINE

## 2023-06-08 PROCEDURE — 80053 COMPREHEN METABOLIC PANEL: CPT | Performed by: FAMILY MEDICINE

## 2023-06-08 PROCEDURE — 80061 LIPID PANEL: CPT | Performed by: FAMILY MEDICINE

## 2023-06-08 PROCEDURE — 36415 COLL VENOUS BLD VENIPUNCTURE: CPT | Performed by: FAMILY MEDICINE

## 2023-06-08 PROCEDURE — 82248 BILIRUBIN DIRECT: CPT | Performed by: FAMILY MEDICINE

## 2023-06-08 PROCEDURE — 74018 RADEX ABDOMEN 1 VIEW: CPT | Performed by: FAMILY MEDICINE

## 2023-06-09 ENCOUNTER — APPOINTMENT (OUTPATIENT)
Dept: CT IMAGING | Facility: HOSPITAL | Age: 33
End: 2023-06-09
Attending: EMERGENCY MEDICINE
Payer: COMMERCIAL

## 2023-06-09 ENCOUNTER — APPOINTMENT (OUTPATIENT)
Dept: GENERAL RADIOLOGY | Facility: HOSPITAL | Age: 33
End: 2023-06-09
Attending: SPECIALIST
Payer: COMMERCIAL

## 2023-06-09 ENCOUNTER — ANESTHESIA (OUTPATIENT)
Dept: SURGERY | Facility: HOSPITAL | Age: 33
End: 2023-06-09
Payer: COMMERCIAL

## 2023-06-09 ENCOUNTER — ANESTHESIA EVENT (OUTPATIENT)
Dept: SURGERY | Facility: HOSPITAL | Age: 33
End: 2023-06-09
Payer: COMMERCIAL

## 2023-06-09 ENCOUNTER — HOSPITAL ENCOUNTER (OUTPATIENT)
Facility: HOSPITAL | Age: 33
Setting detail: OBSERVATION
Discharge: HOME | End: 2023-06-10
Attending: EMERGENCY MEDICINE | Admitting: HOSPITALIST
Payer: COMMERCIAL

## 2023-06-09 DIAGNOSIS — K81.0 ACUTE CHOLECYSTITIS: Primary | ICD-10-CM

## 2023-06-09 LAB
ALBUMIN SERPL-MCNC: 3.6 G/DL (ref 3.4–5)
ALP LIVER SERPL-CCNC: 74 U/L
ALT SERPL-CCNC: 79 U/L
ANION GAP SERPL CALC-SCNC: 6 MMOL/L (ref 0–18)
AST SERPL-CCNC: 63 U/L (ref 15–37)
ATRIAL RATE: 91 BPM
B-HCG UR QL: NEGATIVE
BASOPHILS # BLD AUTO: 0.02 X10(3) UL (ref 0–0.2)
BASOPHILS NFR BLD AUTO: 0.3 %
BILIRUB DIRECT SERPL-MCNC: <0.1 MG/DL (ref 0–0.2)
BILIRUB SERPL-MCNC: 0.3 MG/DL (ref 0.1–2)
BILIRUB UR QL: NEGATIVE
BUN BLD-MCNC: 14 MG/DL (ref 7–18)
BUN/CREAT SERPL: 20 (ref 10–20)
CALCIUM BLD-MCNC: 8.8 MG/DL (ref 8.5–10.1)
CHLORIDE SERPL-SCNC: 111 MMOL/L (ref 98–112)
CLARITY UR: CLEAR
CO2 SERPL-SCNC: 23 MMOL/L (ref 21–32)
COLOR UR: YELLOW
CREAT BLD-MCNC: 0.7 MG/DL
DEPRECATED RDW RBC AUTO: 44.6 FL (ref 35.1–46.3)
EOSINOPHIL # BLD AUTO: 0.12 X10(3) UL (ref 0–0.7)
EOSINOPHIL NFR BLD AUTO: 1.6 %
ERYTHROCYTE [DISTWIDTH] IN BLOOD BY AUTOMATED COUNT: 13.2 % (ref 11–15)
GFR SERPLBLD BASED ON 1.73 SQ M-ARVRAT: 117 ML/MIN/1.73M2 (ref 60–?)
GLUCOSE BLD-MCNC: 107 MG/DL (ref 70–99)
GLUCOSE UR-MCNC: NEGATIVE MG/DL
HCT VFR BLD AUTO: 39.4 %
HGB BLD-MCNC: 12.7 G/DL
HGB UR QL STRIP.AUTO: NEGATIVE
IMM GRANULOCYTES # BLD AUTO: 0.02 X10(3) UL (ref 0–1)
IMM GRANULOCYTES NFR BLD: 0.3 %
KETONES UR-MCNC: NEGATIVE MG/DL
LEUKOCYTE ESTERASE UR QL STRIP.AUTO: NEGATIVE
LIPASE SERPL-CCNC: 37 U/L (ref 13–75)
LYMPHOCYTES # BLD AUTO: 2.39 X10(3) UL (ref 1–4)
LYMPHOCYTES NFR BLD AUTO: 32.2 %
MCH RBC QN AUTO: 29.5 PG (ref 26–34)
MCHC RBC AUTO-ENTMCNC: 32.2 G/DL (ref 31–37)
MCV RBC AUTO: 91.4 FL
MONOCYTES # BLD AUTO: 0.39 X10(3) UL (ref 0.1–1)
MONOCYTES NFR BLD AUTO: 5.2 %
NEUTROPHILS # BLD AUTO: 4.49 X10 (3) UL (ref 1.5–7.7)
NEUTROPHILS # BLD AUTO: 4.49 X10(3) UL (ref 1.5–7.7)
NEUTROPHILS NFR BLD AUTO: 60.4 %
NITRITE UR QL STRIP.AUTO: NEGATIVE
OSMOLALITY SERPL CALC.SUM OF ELEC: 291 MOSM/KG (ref 275–295)
P AXIS: 56 DEGREES
P-R INTERVAL: 142 MS
PH UR: 5.5 [PH] (ref 5–8)
PLATELET # BLD AUTO: 211 10(3)UL (ref 150–450)
POTASSIUM SERPL-SCNC: 3.5 MMOL/L (ref 3.5–5.1)
PROT SERPL-MCNC: 6.9 G/DL (ref 6.4–8.2)
PROT UR-MCNC: NEGATIVE MG/DL
Q-T INTERVAL: 380 MS
QRS DURATION: 80 MS
QTC CALCULATION (BEZET): 467 MS
R AXIS: 11 DEGREES
RBC # BLD AUTO: 4.31 X10(6)UL
SODIUM SERPL-SCNC: 140 MMOL/L (ref 136–145)
SP GR UR STRIP: >=1.03 (ref 1–1.03)
T AXIS: 27 DEGREES
TROPONIN I HIGH SENSITIVITY: <3 NG/L
UROBILINOGEN UR STRIP-ACNC: 0.2
VENTRICULAR RATE: 91 BPM
WBC # BLD AUTO: 7.4 X10(3) UL (ref 4–11)

## 2023-06-09 PROCEDURE — 74177 CT ABD & PELVIS W/CONTRAST: CPT | Performed by: EMERGENCY MEDICINE

## 2023-06-09 PROCEDURE — BF101ZZ FLUOROSCOPY OF BILE DUCTS USING LOW OSMOLAR CONTRAST: ICD-10-PCS | Performed by: SPECIALIST

## 2023-06-09 PROCEDURE — 0FT44ZZ RESECTION OF GALLBLADDER, PERCUTANEOUS ENDOSCOPIC APPROACH: ICD-10-PCS | Performed by: SPECIALIST

## 2023-06-09 PROCEDURE — 74300 X-RAY BILE DUCTS/PANCREAS: CPT | Performed by: SPECIALIST

## 2023-06-09 PROCEDURE — 99222 1ST HOSP IP/OBS MODERATE 55: CPT | Performed by: HOSPITALIST

## 2023-06-09 RX ORDER — ONDANSETRON 2 MG/ML
4 INJECTION INTRAMUSCULAR; INTRAVENOUS EVERY 6 HOURS PRN
Status: DISCONTINUED | OUTPATIENT
Start: 2023-06-09 | End: 2023-06-09 | Stop reason: HOSPADM

## 2023-06-09 RX ORDER — AMITRIPTYLINE HYDROCHLORIDE 10 MG/1
10 TABLET, FILM COATED ORAL NIGHTLY
Status: DISCONTINUED | OUTPATIENT
Start: 2023-06-09 | End: 2023-06-10

## 2023-06-09 RX ORDER — MORPHINE SULFATE 4 MG/ML
4 INJECTION, SOLUTION INTRAMUSCULAR; INTRAVENOUS EVERY 2 HOUR PRN
Status: DISCONTINUED | OUTPATIENT
Start: 2023-06-09 | End: 2023-06-09

## 2023-06-09 RX ORDER — PROCHLORPERAZINE EDISYLATE 5 MG/ML
5 INJECTION INTRAMUSCULAR; INTRAVENOUS EVERY 8 HOURS PRN
Status: DISCONTINUED | OUTPATIENT
Start: 2023-06-09 | End: 2023-06-09

## 2023-06-09 RX ORDER — HYDROMORPHONE HYDROCHLORIDE 1 MG/ML
0.2 INJECTION, SOLUTION INTRAMUSCULAR; INTRAVENOUS; SUBCUTANEOUS EVERY 5 MIN PRN
Status: DISCONTINUED | OUTPATIENT
Start: 2023-06-09 | End: 2023-06-09 | Stop reason: HOSPADM

## 2023-06-09 RX ORDER — ENOXAPARIN SODIUM 100 MG/ML
40 INJECTION SUBCUTANEOUS DAILY
Status: DISCONTINUED | OUTPATIENT
Start: 2023-06-10 | End: 2023-06-10

## 2023-06-09 RX ORDER — NALOXONE HYDROCHLORIDE 0.4 MG/ML
80 INJECTION, SOLUTION INTRAMUSCULAR; INTRAVENOUS; SUBCUTANEOUS AS NEEDED
Status: DISCONTINUED | OUTPATIENT
Start: 2023-06-09 | End: 2023-06-09 | Stop reason: HOSPADM

## 2023-06-09 RX ORDER — MORPHINE SULFATE 2 MG/ML
2 INJECTION, SOLUTION INTRAMUSCULAR; INTRAVENOUS EVERY 2 HOUR PRN
Status: DISCONTINUED | OUTPATIENT
Start: 2023-06-09 | End: 2023-06-09

## 2023-06-09 RX ORDER — METRONIDAZOLE 500 MG/100ML
500 INJECTION, SOLUTION INTRAVENOUS EVERY 8 HOURS
Status: DISCONTINUED | OUTPATIENT
Start: 2023-06-09 | End: 2023-06-10

## 2023-06-09 RX ORDER — SODIUM CHLORIDE 9 MG/ML
INJECTION, SOLUTION INTRAVENOUS CONTINUOUS
Status: ACTIVE | OUTPATIENT
Start: 2023-06-09 | End: 2023-06-09

## 2023-06-09 RX ORDER — DEXAMETHASONE SODIUM PHOSPHATE 4 MG/ML
VIAL (ML) INJECTION AS NEEDED
Status: DISCONTINUED | OUTPATIENT
Start: 2023-06-09 | End: 2023-06-09 | Stop reason: SURG

## 2023-06-09 RX ORDER — MORPHINE SULFATE 10 MG/ML
6 INJECTION, SOLUTION INTRAMUSCULAR; INTRAVENOUS EVERY 10 MIN PRN
Status: DISCONTINUED | OUTPATIENT
Start: 2023-06-09 | End: 2023-06-09 | Stop reason: HOSPADM

## 2023-06-09 RX ORDER — ONDANSETRON 2 MG/ML
4 INJECTION INTRAMUSCULAR; INTRAVENOUS ONCE
Status: COMPLETED | OUTPATIENT
Start: 2023-06-09 | End: 2023-06-09

## 2023-06-09 RX ORDER — HYDROMORPHONE HYDROCHLORIDE 1 MG/ML
0.5 INJECTION, SOLUTION INTRAMUSCULAR; INTRAVENOUS; SUBCUTANEOUS EVERY 2 HOUR PRN
Status: DISCONTINUED | OUTPATIENT
Start: 2023-06-09 | End: 2023-06-10

## 2023-06-09 RX ORDER — METRONIDAZOLE 500 MG/100ML
500 INJECTION, SOLUTION INTRAVENOUS ONCE
Status: COMPLETED | OUTPATIENT
Start: 2023-06-09 | End: 2023-06-10

## 2023-06-09 RX ORDER — HYDROMORPHONE HYDROCHLORIDE 1 MG/ML
0.4 INJECTION, SOLUTION INTRAMUSCULAR; INTRAVENOUS; SUBCUTANEOUS EVERY 5 MIN PRN
Status: DISCONTINUED | OUTPATIENT
Start: 2023-06-09 | End: 2023-06-09 | Stop reason: HOSPADM

## 2023-06-09 RX ORDER — OXYCODONE HYDROCHLORIDE 5 MG/1
15 TABLET ORAL EVERY 4 HOURS PRN
Status: DISCONTINUED | OUTPATIENT
Start: 2023-06-09 | End: 2023-06-10

## 2023-06-09 RX ORDER — CEFAZOLIN SODIUM 1 G/3ML
INJECTION, POWDER, FOR SOLUTION INTRAMUSCULAR; INTRAVENOUS AS NEEDED
Status: DISCONTINUED | OUTPATIENT
Start: 2023-06-09 | End: 2023-06-09 | Stop reason: SURG

## 2023-06-09 RX ORDER — MIDAZOLAM HYDROCHLORIDE 1 MG/ML
INJECTION INTRAMUSCULAR; INTRAVENOUS AS NEEDED
Status: DISCONTINUED | OUTPATIENT
Start: 2023-06-09 | End: 2023-06-09 | Stop reason: SURG

## 2023-06-09 RX ORDER — SODIUM CHLORIDE, SODIUM LACTATE, POTASSIUM CHLORIDE, CALCIUM CHLORIDE 600; 310; 30; 20 MG/100ML; MG/100ML; MG/100ML; MG/100ML
INJECTION, SOLUTION INTRAVENOUS CONTINUOUS
Status: DISCONTINUED | OUTPATIENT
Start: 2023-06-09 | End: 2023-06-09 | Stop reason: HOSPADM

## 2023-06-09 RX ORDER — LIDOCAINE HYDROCHLORIDE 10 MG/ML
INJECTION, SOLUTION EPIDURAL; INFILTRATION; INTRACAUDAL; PERINEURAL AS NEEDED
Status: DISCONTINUED | OUTPATIENT
Start: 2023-06-09 | End: 2023-06-09 | Stop reason: SURG

## 2023-06-09 RX ORDER — HYDROMORPHONE HYDROCHLORIDE 1 MG/ML
1 INJECTION, SOLUTION INTRAMUSCULAR; INTRAVENOUS; SUBCUTANEOUS EVERY 2 HOUR PRN
Status: DISCONTINUED | OUTPATIENT
Start: 2023-06-09 | End: 2023-06-10

## 2023-06-09 RX ORDER — MORPHINE SULFATE 4 MG/ML
4 INJECTION, SOLUTION INTRAMUSCULAR; INTRAVENOUS EVERY 10 MIN PRN
Status: DISCONTINUED | OUTPATIENT
Start: 2023-06-09 | End: 2023-06-09 | Stop reason: HOSPADM

## 2023-06-09 RX ORDER — ACETAMINOPHEN 325 MG/1
650 TABLET ORAL EVERY 6 HOURS PRN
Status: DISCONTINUED | OUTPATIENT
Start: 2023-06-09 | End: 2023-06-10

## 2023-06-09 RX ORDER — ACETAMINOPHEN 325 MG/1
650 TABLET ORAL EVERY 4 HOURS PRN
Status: DISCONTINUED | OUTPATIENT
Start: 2023-06-09 | End: 2023-06-09

## 2023-06-09 RX ORDER — DEXTROSE, SODIUM CHLORIDE, AND POTASSIUM CHLORIDE 5; .9; .15 G/100ML; G/100ML; G/100ML
INJECTION INTRAVENOUS CONTINUOUS
Status: DISCONTINUED | OUTPATIENT
Start: 2023-06-09 | End: 2023-06-09

## 2023-06-09 RX ORDER — MORPHINE SULFATE 2 MG/ML
1 INJECTION, SOLUTION INTRAMUSCULAR; INTRAVENOUS EVERY 2 HOUR PRN
Status: DISCONTINUED | OUTPATIENT
Start: 2023-06-09 | End: 2023-06-09

## 2023-06-09 RX ORDER — ONDANSETRON 2 MG/ML
INJECTION INTRAMUSCULAR; INTRAVENOUS AS NEEDED
Status: DISCONTINUED | OUTPATIENT
Start: 2023-06-09 | End: 2023-06-09 | Stop reason: SURG

## 2023-06-09 RX ORDER — HYDROCODONE BITARTRATE AND ACETAMINOPHEN 5; 325 MG/1; MG/1
2 TABLET ORAL EVERY 4 HOURS PRN
Status: DISCONTINUED | OUTPATIENT
Start: 2023-06-09 | End: 2023-06-09

## 2023-06-09 RX ORDER — HYDROCODONE BITARTRATE AND ACETAMINOPHEN 5; 325 MG/1; MG/1
1 TABLET ORAL EVERY 4 HOURS PRN
Status: DISCONTINUED | OUTPATIENT
Start: 2023-06-09 | End: 2023-06-09

## 2023-06-09 RX ORDER — HYDROMORPHONE HYDROCHLORIDE 1 MG/ML
0.6 INJECTION, SOLUTION INTRAMUSCULAR; INTRAVENOUS; SUBCUTANEOUS EVERY 5 MIN PRN
Status: DISCONTINUED | OUTPATIENT
Start: 2023-06-09 | End: 2023-06-09 | Stop reason: HOSPADM

## 2023-06-09 RX ORDER — MORPHINE SULFATE 4 MG/ML
2 INJECTION, SOLUTION INTRAMUSCULAR; INTRAVENOUS EVERY 10 MIN PRN
Status: DISCONTINUED | OUTPATIENT
Start: 2023-06-09 | End: 2023-06-09 | Stop reason: HOSPADM

## 2023-06-09 RX ORDER — ONDANSETRON 2 MG/ML
4 INJECTION INTRAMUSCULAR; INTRAVENOUS EVERY 6 HOURS PRN
Status: DISCONTINUED | OUTPATIENT
Start: 2023-06-09 | End: 2023-06-10

## 2023-06-09 RX ORDER — ROCURONIUM BROMIDE 10 MG/ML
INJECTION, SOLUTION INTRAVENOUS AS NEEDED
Status: DISCONTINUED | OUTPATIENT
Start: 2023-06-09 | End: 2023-06-09 | Stop reason: SURG

## 2023-06-09 RX ORDER — SODIUM CHLORIDE, SODIUM LACTATE, POTASSIUM CHLORIDE, CALCIUM CHLORIDE 600; 310; 30; 20 MG/100ML; MG/100ML; MG/100ML; MG/100ML
INJECTION, SOLUTION INTRAVENOUS CONTINUOUS PRN
Status: DISCONTINUED | OUTPATIENT
Start: 2023-06-09 | End: 2023-06-09 | Stop reason: SURG

## 2023-06-09 RX ORDER — EPHEDRINE SULFATE 50 MG/ML
INJECTION, SOLUTION INTRAVENOUS AS NEEDED
Status: DISCONTINUED | OUTPATIENT
Start: 2023-06-09 | End: 2023-06-09 | Stop reason: SURG

## 2023-06-09 RX ORDER — OXYCODONE HYDROCHLORIDE 5 MG/1
10 TABLET ORAL EVERY 4 HOURS PRN
Status: DISCONTINUED | OUTPATIENT
Start: 2023-06-09 | End: 2023-06-10

## 2023-06-09 RX ORDER — DEXTROSE, SODIUM CHLORIDE, AND POTASSIUM CHLORIDE 5; .45; .15 G/100ML; G/100ML; G/100ML
INJECTION INTRAVENOUS CONTINUOUS
Status: DISCONTINUED | OUTPATIENT
Start: 2023-06-09 | End: 2023-06-10

## 2023-06-09 RX ORDER — ACETAMINOPHEN 500 MG
500 TABLET ORAL EVERY 4 HOURS PRN
Status: DISCONTINUED | OUTPATIENT
Start: 2023-06-09 | End: 2023-06-09

## 2023-06-09 RX ORDER — KETOROLAC TROMETHAMINE 30 MG/ML
INJECTION, SOLUTION INTRAMUSCULAR; INTRAVENOUS AS NEEDED
Status: DISCONTINUED | OUTPATIENT
Start: 2023-06-09 | End: 2023-06-09 | Stop reason: SURG

## 2023-06-09 RX ORDER — MORPHINE SULFATE 4 MG/ML
4 INJECTION, SOLUTION INTRAMUSCULAR; INTRAVENOUS ONCE
Status: COMPLETED | OUTPATIENT
Start: 2023-06-09 | End: 2023-06-09

## 2023-06-09 RX ORDER — HYDROCODONE BITARTRATE AND ACETAMINOPHEN 5; 325 MG/1; MG/1
1 TABLET ORAL EVERY 4 HOURS PRN
Status: DISCONTINUED | OUTPATIENT
Start: 2023-06-09 | End: 2023-06-10

## 2023-06-09 RX ADMIN — DEXAMETHASONE SODIUM PHOSPHATE 4 MG: 4 MG/ML VIAL (ML) INJECTION at 13:09:00

## 2023-06-09 RX ADMIN — LIDOCAINE HYDROCHLORIDE 25 MG: 10 INJECTION, SOLUTION EPIDURAL; INFILTRATION; INTRACAUDAL; PERINEURAL at 13:01:00

## 2023-06-09 RX ADMIN — KETOROLAC TROMETHAMINE 30 MG: 30 INJECTION, SOLUTION INTRAMUSCULAR; INTRAVENOUS at 13:53:00

## 2023-06-09 RX ADMIN — MIDAZOLAM HYDROCHLORIDE 2 MG: 1 INJECTION INTRAMUSCULAR; INTRAVENOUS at 12:58:00

## 2023-06-09 RX ADMIN — ROCURONIUM BROMIDE 40 MG: 10 INJECTION, SOLUTION INTRAVENOUS at 13:02:00

## 2023-06-09 RX ADMIN — CEFAZOLIN SODIUM 2 G: 1 INJECTION, POWDER, FOR SOLUTION INTRAMUSCULAR; INTRAVENOUS at 13:10:00

## 2023-06-09 RX ADMIN — SODIUM CHLORIDE, SODIUM LACTATE, POTASSIUM CHLORIDE, CALCIUM CHLORIDE: 600; 310; 30; 20 INJECTION, SOLUTION INTRAVENOUS at 12:19:00

## 2023-06-09 RX ADMIN — EPHEDRINE SULFATE 5 MG: 50 INJECTION, SOLUTION INTRAVENOUS at 13:24:00

## 2023-06-09 RX ADMIN — ROCURONIUM BROMIDE 10 MG: 10 INJECTION, SOLUTION INTRAVENOUS at 13:22:00

## 2023-06-09 RX ADMIN — ONDANSETRON 4 MG: 2 INJECTION INTRAMUSCULAR; INTRAVENOUS at 13:09:00

## 2023-06-09 NOTE — ANESTHESIA PROCEDURE NOTES
Airway  Date/Time: 6/9/2023 1:02 PM  Urgency: elective    Airway not difficult    General Information and Staff    Patient location during procedure: OR  Anesthesiologist: Park Marina MD  Resident/CRNA: Jose Cordero CRNA  Performed: CRNA   Performed by: Jose Cordero CRNA  Authorized by: Park Marina MD      Indications and Patient Condition  Indications for airway management: airway protection and anesthesia  Spontaneous Ventilation: absent  Sedation level: deep  Preoxygenated: yes  Patient position: sniffing  MILS maintained throughout  Mask difficulty assessment: 2 - vent by mask + OA or adjuvant +/- NMBA    Final Airway Details  Final airway type: endotracheal airway      Successful airway: ETT  Cuffed: yes   Successful intubation technique: direct laryngoscopy  Facilitating devices/methods: intubating stylet  Endotracheal tube insertion site: oral  Blade: Roslyn  Blade size: #3  ETT size (mm): 7.0    Cormack-Lehane Classification: grade IIA - partial view of glottis  Placement verified by: capnometry   Measured from: teeth  ETT to teeth (cm): 20  Number of attempts at approach: 1  Number of other approaches attempted: 0

## 2023-06-09 NOTE — BRIEF OP NOTE
Patients Name: Katarzyna Coleman  Attending Physician: Issa Beyer MD  Operating Physician: Judith Patel MD  CSN: [id number]     Location:  OR  MRN: [id number]    YOB: 1990  Admission Date: 6/9/2023  Operation Date: 6/9/2023    Brief Operative Report    Pre-Operative Diagnosis: acute cholecystitis cholelithiasis    Post-Operative Diagnosis: Same as above.     Procedure Performed: LAPAROSCOPIC CHOLECYSTECTOMY, intra-operative cholangiogram,     Surgeon: Judith Patel MD    Assistants: Clarissa Ritter SA    Anesthesia: General    Attending:  Elesa Pallas    EBL: 5cc    Findings: mild cholecystitis  cholelithiasis    Specimens:    ID Type Source Tests Collected by Time Destination   1 : 1. gallbladder and contents Tissue Gallbladder SURGICAL PATHOLOGY TISSUE Barbara Durbin MD 6/9/2023 1322        Drains: none    Complications: None    Disposition: stable     Judith Patel MD  6/9/2023  1:52 PM

## 2023-06-09 NOTE — ED QUICK NOTES
Orders for admission, patient is aware of plan and ready to go upstairs. Any questions, please call ED RN Max at extension [phone]. Patient Covid vaccination status: Fully vaccinated     COVID Test Ordered in ED: None    COVID Suspicion at Admission: N/A    Running Infusions:  Flagyl? Mental Status/LOC at time of transport:  Aox3    Other pertinent information:   CIWA score: N/A   NIH score:  N/A

## 2023-06-09 NOTE — ED PROVIDER NOTES
Signed out by previous shift to follow-up results of CT and disposition patient. Patient CT show findings consistent with acute cholecystitis. Patient has stones with gallbladder wall thickening but no CBD enlargement. On reevaluation, patient's pain is significantly improved though patient continues to have positive Flynn sign. Patient covered with ceftriaxone and Flagyl. Discussed with [place] [address] hospitalist who has accepted patient for admission. Discussed with Dr. Chris Walsh from surgery who has accepted consult.   Patient and family have been updated and are comfortable with plan for admission and understand need for likely surgery for gallbladder removal.

## 2023-06-09 NOTE — PLAN OF CARE
Patient alert and oriented. Prn pain meds as ordered. CLD. Cholecystectomy done today. IVF running. IV abx. Call light within reach. Problem: Patient Centered Care  Goal: Patient preferences are identified and integrated in the patient's plan of care  Description: Interventions:  - What would you like us to know as we care for you?  From home with family  - Provide timely, complete, and accurate information to patient/family  - Incorporate patient and family knowledge, values, beliefs, and cultural backgrounds into the planning and delivery of care  - Encourage patient/family to participate in care and decision-making at the level they choose  - Honor patient and family perspectives and choices  Outcome: Progressing     Problem: Patient/Family Goals  Goal: Patient/Family Long Term Goal  Description: Patient's Long Term Goal: go home    Interventions:  - discharge planning  -monitor labs  - See additional Care Plan goals for specific interventions  Outcome: Progressing  Goal: Patient/Family Short Term Goal  Description: Patient's Short Term Goal: pain relief    Interventions:   - monitor and assess pain levels  -pain meds as ordered   - See additional Care Plan goals for specific interventions  Outcome: Progressing     Problem: PAIN - ADULT  Goal: Verbalizes/displays adequate comfort level or patient's stated pain goal  Description: INTERVENTIONS:  - Encourage pt to monitor pain and request assistance  - Assess pain using appropriate pain scale  - Administer analgesics based on type and severity of pain and evaluate response  - Implement non-pharmacological measures as appropriate and evaluate response  - Consider cultural and social influences on pain and pain management  - Manage/alleviate anxiety  - Utilize distraction and/or relaxation techniques  - Monitor for opioid side effects  - Notify MD/LIP if interventions unsuccessful or patient reports new pain  - Anticipate increased pain with activity and pre-medicate as appropriate  Outcome: Progressing

## 2023-06-09 NOTE — ED INITIAL ASSESSMENT (HPI)
Pt presents through triage with c/o epigastric pain radiating into her back. Pt reports that she typically gets heartburn but has never been like this and only thing she has done differently is take a medicine for IBS. Pt has not ate since yesterday afternoon.

## 2023-06-10 VITALS
BODY MASS INDEX: 30.73 KG/M2 | DIASTOLIC BLOOD PRESSURE: 66 MMHG | OXYGEN SATURATION: 98 % | HEART RATE: 84 BPM | WEIGHT: 167 LBS | RESPIRATION RATE: 16 BRPM | TEMPERATURE: 99 F | HEIGHT: 62 IN | SYSTOLIC BLOOD PRESSURE: 90 MMHG

## 2023-06-10 LAB
ALBUMIN SERPL-MCNC: 3.2 G/DL (ref 3.4–5)
ALBUMIN/GLOB SERPL: 1 {RATIO} (ref 1–2)
ALP LIVER SERPL-CCNC: 102 U/L
ALT SERPL-CCNC: 313 U/L
AMYLASE SERPL-CCNC: 55 U/L (ref 25–115)
ANION GAP SERPL CALC-SCNC: 5 MMOL/L (ref 0–18)
AST SERPL-CCNC: 183 U/L (ref 15–37)
BASOPHILS # BLD AUTO: 0.01 X10(3) UL (ref 0–0.2)
BASOPHILS NFR BLD AUTO: 0.1 %
BILIRUB DIRECT SERPL-MCNC: 0.1 MG/DL (ref 0–0.2)
BILIRUB SERPL-MCNC: 0.5 MG/DL (ref 0.1–2)
BUN BLD-MCNC: 5 MG/DL (ref 7–18)
BUN/CREAT SERPL: 8.9 (ref 10–20)
CALCIUM BLD-MCNC: 8.3 MG/DL (ref 8.5–10.1)
CHLORIDE SERPL-SCNC: 112 MMOL/L (ref 98–112)
CO2 SERPL-SCNC: 25 MMOL/L (ref 21–32)
CREAT BLD-MCNC: 0.56 MG/DL
DEPRECATED RDW RBC AUTO: 45.5 FL (ref 35.1–46.3)
EOSINOPHIL # BLD AUTO: 0.01 X10(3) UL (ref 0–0.7)
EOSINOPHIL NFR BLD AUTO: 0.1 %
ERYTHROCYTE [DISTWIDTH] IN BLOOD BY AUTOMATED COUNT: 13.4 % (ref 11–15)
GFR SERPLBLD BASED ON 1.73 SQ M-ARVRAT: 124 ML/MIN/1.73M2 (ref 60–?)
GLOBULIN PLAS-MCNC: 3.1 G/DL (ref 2.8–4.4)
GLUCOSE BLD-MCNC: 128 MG/DL (ref 70–99)
HCT VFR BLD AUTO: 36.9 %
HGB BLD-MCNC: 11.6 G/DL
IMM GRANULOCYTES # BLD AUTO: 0.04 X10(3) UL (ref 0–1)
IMM GRANULOCYTES NFR BLD: 0.4 %
LIPASE SERPL-CCNC: 40 U/L (ref 13–75)
LYMPHOCYTES # BLD AUTO: 0.95 X10(3) UL (ref 1–4)
LYMPHOCYTES NFR BLD AUTO: 9.2 %
MCH RBC QN AUTO: 28.9 PG (ref 26–34)
MCHC RBC AUTO-ENTMCNC: 31.4 G/DL (ref 31–37)
MCV RBC AUTO: 92 FL
MONOCYTES # BLD AUTO: 0.54 X10(3) UL (ref 0.1–1)
MONOCYTES NFR BLD AUTO: 5.2 %
NEUTROPHILS # BLD AUTO: 8.77 X10 (3) UL (ref 1.5–7.7)
NEUTROPHILS # BLD AUTO: 8.77 X10(3) UL (ref 1.5–7.7)
NEUTROPHILS NFR BLD AUTO: 85 %
OSMOLALITY SERPL CALC.SUM OF ELEC: 293 MOSM/KG (ref 275–295)
PLATELET # BLD AUTO: 195 10(3)UL (ref 150–450)
POTASSIUM SERPL-SCNC: 4.2 MMOL/L (ref 3.5–5.1)
PROT SERPL-MCNC: 6.3 G/DL (ref 6.4–8.2)
RBC # BLD AUTO: 4.01 X10(6)UL
SODIUM SERPL-SCNC: 142 MMOL/L (ref 136–145)
WBC # BLD AUTO: 10.3 X10(3) UL (ref 4–11)

## 2023-06-10 PROCEDURE — 99239 HOSP IP/OBS DSCHRG MGMT >30: CPT | Performed by: HOSPITALIST

## 2023-06-10 RX ORDER — HYDROCODONE BITARTRATE AND ACETAMINOPHEN 5; 325 MG/1; MG/1
1 TABLET ORAL EVERY 4 HOURS PRN
Qty: 20 TABLET | Refills: 0 | Status: SHIPPED | OUTPATIENT
Start: 2023-06-10

## 2023-06-10 RX ORDER — ACETAMINOPHEN 325 MG/1
650 TABLET ORAL EVERY 6 HOURS PRN
Refills: 0 | Status: SHIPPED | COMMUNITY
Start: 2023-06-10

## 2023-06-10 NOTE — OPERATIVE REPORT
[facility]    PATIENT'S NAME: Tanisha David PHYSICIAN: Yamel Spain MD   OPERATING PHYSICIAN: Laurie Chan MD   PATIENT ACCOUNT#:   [id number]    LOCATION:  [address] [place] [address] RECORD #:   [id number]       YOB: 1990  ADMISSION DATE:       06/09/2023      OPERATION DATE:  06/09/2023    OPERATIVE REPORT    PREOPERATIVE DIAGNOSIS:  Acute cholecystitis, cholelithiasis. POSTOPERATIVE DIAGNOSIS:  Acute cholecystitis, cholelithiasis. PROCEDURE:  Laparoscopic cholecystectomy, intraoperative cholangiogram.    ASSISTANT:  JAVY Alarcon. ANESTHESIA:  General.    BLOOD LOSS:  5 mL. DRAINS:  None. COMPLICATION:  None. DISPOSITION:  Stable. FINDINGS:  Mild cholecystitis, cholelithiasis. OPERATIVE TECHNIQUE:  Patient presented with acute cholecystitis, cholelithiasis; came in the operating room; underwent general endotracheal anesthesia. Compression boots were placed. We prepped and draped the abdomen, began by making a supraumbilical incision under direct vision, inserted a #11 bladeless trocar, in the upper midline 11, two lateral 5 mm bladeless trocars placed under direct vision. Quickly, we went to the gallbladder. It was mildly distended and thickened. Using various instrumentation, we dissected the base of the gallbladder, located the cystic duct and the cystic artery. The duct was clipped proximally, opened with a microscissors. Using a percutaneous taut cholangiocatheter, inserted this in the cystic duct. Did an intraoperative cholangiogram, showed normal proximal and distal filling. No intraluminal defects. Removed the catheter, clipped the cystic duct, divided the cystic duct. Endoloop placed around its end. Cystic artery triply clipped and divided. Gallbladder removed with electrocoagulation, placed in Endobag, and removed. Checked for any residual bleeding, there was none. No Avitene was placed in the bed of the liver.   All instruments removed under direct vision. Fascia closed with Vicryl, skin with subcuticular Monocryl, Dermabond, infiltrated 0.5% Marcaine without. No complications. Patient tolerated the procedure well. Patient left the operating room in stable condition.     Dictated By Anne Parnell MD  d: 06/09/2023 13:54:22  t: 06/09/2023 21:49:24  [facility] [id number]  G/

## 2023-06-10 NOTE — CM/SW NOTE
06/10/23 1400   Discharge disposition   Expected discharge disposition Home or Self   Discharge transportation Private car     Onel PACHECON RN Preston Mao RN Case Manager  [phone]

## 2023-06-10 NOTE — PLAN OF CARE
Patient alert and oriented. Advance as tolerated diet. Walking in halls and tolerating well. Call light within reach. Patient to discharge home today with family. IV out. AVS reviewed with patient and family, no questions or concerns at this time. Prescriptions sent to pharmacy. RTW note and belongings sent home with patient. Problem: Patient Centered Care  Goal: Patient preferences are identified and integrated in the patient's plan of care  Description: Interventions:  - What would you like us to know as we care for you?  From home with family  - Provide timely, complete, and accurate information to patient/family  - Incorporate patient and family knowledge, values, beliefs, and cultural backgrounds into the planning and delivery of care  - Encourage patient/family to participate in care and decision-making at the level they choose  - Honor patient and family perspectives and choices  Outcome: Progressing     Problem: Patient/Family Goals  Goal: Patient/Family Long Term Goal  Description: Patient's Long Term Goal: go home    Interventions:  - monitor labs  -discharge planning  - See additional Care Plan goals for specific interventions  Outcome: Progressing  Goal: Patient/Family Short Term Goal  Description: Patient's Short Term Goal: pain relief    Interventions:   - monitor and assess pain levels  -pain meds as ordered  - See additional Care Plan goals for specific interventions  Outcome: Progressing     Problem: PAIN - ADULT  Goal: Verbalizes/displays adequate comfort level or patient's stated pain goal  Description: INTERVENTIONS:  - Encourage pt to monitor pain and request assistance  - Assess pain using appropriate pain scale  - Administer analgesics based on type and severity of pain and evaluate response  - Implement non-pharmacological measures as appropriate and evaluate response  - Consider cultural and social influences on pain and pain management  - Manage/alleviate anxiety  - Utilize distraction and/or relaxation techniques  - Monitor for opioid side effects  - Notify MD/LIP if interventions unsuccessful or patient reports new pain  - Anticipate increased pain with activity and pre-medicate as appropriate  Outcome: Progressing

## 2023-06-10 NOTE — PLAN OF CARE
Patient vital signs stable. IV fluids running. IV antibiotics given. PRN Dilaudid given for pain. No acute changes. Problem: Patient Centered Care  Goal: Patient preferences are identified and integrated in the patient's plan of care  Description: Interventions:  - What would you like us to know as we care for you?  From home with family.  - Provide timely, complete, and accurate information to patient/family  - Incorporate patient and family knowledge, values, beliefs, and cultural backgrounds into the planning and delivery of care  - Encourage patient/family to participate in care and decision-making at the level they choose  - Honor patient and family perspectives and choices  Outcome: Progressing      Problem: PAIN - ADULT  Goal: Verbalizes/displays adequate comfort level or patient's stated pain goal  Description: INTERVENTIONS:  - Encourage pt to monitor pain and request assistance  - Assess pain using appropriate pain scale  - Administer analgesics based on type and severity of pain and evaluate response  - Implement non-pharmacological measures as appropriate and evaluate response  - Consider cultural and social influences on pain and pain management  - Manage/alleviate anxiety  - Utilize distraction and/or relaxation techniques  - Monitor for opioid side effects  - Notify MD/LIP if interventions unsuccessful or patient reports new pain  - Anticipate increased pain with activity and pre-medicate as appropriate  Outcome: Progressing

## 2023-06-12 ENCOUNTER — PATIENT OUTREACH (OUTPATIENT)
Dept: CASE MANAGEMENT | Age: 33
End: 2023-06-12

## 2023-06-12 NOTE — PROGRESS NOTES
Responding to patient's voicemail.    (dc 6/10)    Stephanie Barth MD  Specialty: SURGERY, GENERAL  [address] [place] [address]  [phone]  Patient set up appointment    Closing encounter.

## 2023-06-13 ENCOUNTER — LAB ENCOUNTER (OUTPATIENT)
Dept: LAB | Facility: HOSPITAL | Age: 33
End: 2023-06-13
Attending: HOSPITALIST
Payer: COMMERCIAL

## 2023-06-13 ENCOUNTER — PATIENT MESSAGE (OUTPATIENT)
Dept: INTERNAL MEDICINE CLINIC | Facility: CLINIC | Age: 33
End: 2023-06-13

## 2023-06-13 ENCOUNTER — TELEPHONE (OUTPATIENT)
Dept: INTERNAL MEDICINE CLINIC | Facility: CLINIC | Age: 33
End: 2023-06-13

## 2023-06-13 DIAGNOSIS — K81.0 ACUTE CHOLECYSTITIS: ICD-10-CM

## 2023-06-13 LAB
ALBUMIN SERPL-MCNC: 3.4 G/DL (ref 3.4–5)
ALBUMIN/GLOB SERPL: 0.9 {RATIO} (ref 1–2)
ALP LIVER SERPL-CCNC: 96 U/L
ALT SERPL-CCNC: 137 U/L
ANION GAP SERPL CALC-SCNC: 7 MMOL/L (ref 0–18)
AST SERPL-CCNC: 31 U/L (ref 15–37)
BILIRUB SERPL-MCNC: 0.3 MG/DL (ref 0.1–2)
BUN BLD-MCNC: 16 MG/DL (ref 7–18)
BUN/CREAT SERPL: 23.5 (ref 10–20)
CALCIUM BLD-MCNC: 9.2 MG/DL (ref 8.5–10.1)
CHLORIDE SERPL-SCNC: 107 MMOL/L (ref 98–112)
CO2 SERPL-SCNC: 25 MMOL/L (ref 21–32)
CREAT BLD-MCNC: 0.68 MG/DL
GFR SERPLBLD BASED ON 1.73 SQ M-ARVRAT: 118 ML/MIN/1.73M2 (ref 60–?)
GLOBULIN PLAS-MCNC: 3.8 G/DL (ref 2.8–4.4)
GLUCOSE BLD-MCNC: 94 MG/DL (ref 70–99)
OSMOLALITY SERPL CALC.SUM OF ELEC: 289 MOSM/KG (ref 275–295)
POTASSIUM SERPL-SCNC: 3.8 MMOL/L (ref 3.5–5.1)
PROT SERPL-MCNC: 7.2 G/DL (ref 6.4–8.2)
SODIUM SERPL-SCNC: 139 MMOL/L (ref 136–145)

## 2023-06-13 PROCEDURE — 87340 HEPATITIS B SURFACE AG IA: CPT | Performed by: FAMILY MEDICINE

## 2023-06-13 PROCEDURE — 82248 BILIRUBIN DIRECT: CPT | Performed by: FAMILY MEDICINE

## 2023-06-13 PROCEDURE — 86803 HEPATITIS C AB TEST: CPT | Performed by: FAMILY MEDICINE

## 2023-06-13 PROCEDURE — 86704 HEP B CORE ANTIBODY TOTAL: CPT | Performed by: FAMILY MEDICINE

## 2023-06-13 PROCEDURE — 36415 COLL VENOUS BLD VENIPUNCTURE: CPT | Performed by: FAMILY MEDICINE

## 2023-06-13 PROCEDURE — 86708 HEPATITIS A ANTIBODY: CPT | Performed by: FAMILY MEDICINE

## 2023-06-13 PROCEDURE — 80053 COMPREHEN METABOLIC PANEL: CPT

## 2023-06-13 PROCEDURE — 86709 HEPATITIS A IGM ANTIBODY: CPT | Performed by: FAMILY MEDICINE

## 2023-06-13 PROCEDURE — 80503 PATH CLIN CONSLTJ SF 5-20: CPT | Performed by: FAMILY MEDICINE

## 2023-06-13 PROCEDURE — 86706 HEP B SURFACE ANTIBODY: CPT | Performed by: FAMILY MEDICINE

## 2023-06-14 ENCOUNTER — OFFICE VISIT (OUTPATIENT)
Dept: INTERNAL MEDICINE CLINIC | Facility: CLINIC | Age: 33
End: 2023-06-14
Payer: COMMERCIAL

## 2023-06-14 VITALS
BODY MASS INDEX: 30 KG/M2 | DIASTOLIC BLOOD PRESSURE: 60 MMHG | WEIGHT: 162 LBS | OXYGEN SATURATION: 98 % | TEMPERATURE: 98 F | SYSTOLIC BLOOD PRESSURE: 104 MMHG | HEART RATE: 91 BPM

## 2023-06-14 DIAGNOSIS — Z90.49 S/P CHOLECYSTECTOMY: Primary | ICD-10-CM

## 2023-06-14 PROCEDURE — 99213 OFFICE O/P EST LOW 20 MIN: CPT | Performed by: FAMILY MEDICINE

## 2023-06-14 PROCEDURE — 3074F SYST BP LT 130 MM HG: CPT | Performed by: FAMILY MEDICINE

## 2023-06-14 PROCEDURE — 3078F DIAST BP <80 MM HG: CPT | Performed by: FAMILY MEDICINE

## 2023-06-14 NOTE — TELEPHONE ENCOUNTER
Results to be reviewed during her appt today, closing encounter. From: Julienne Salcido  To: Samantha Jasso MD  Sent: 6/13/2023 11:17 AM CDT  Subject: Hep results     What does hepatitis Areactive mean? And also my CT scan show cyst on my kidneys what does that mean?

## 2023-06-22 ENCOUNTER — TELEPHONE (OUTPATIENT)
Dept: INTERNAL MEDICINE CLINIC | Facility: CLINIC | Age: 33
End: 2023-06-22

## 2023-10-17 NOTE — ANESTHESIA POSTPROCEDURE EVALUATION
Patient: Vito Langston    Procedure Summary     Date: 06/09/23 Room / Location: [address] [place] [address] MAIN OR 12 / [address] [place] [address] MAIN OR    Anesthesia Start: 2482 Anesthesia Stop:     Procedure: LAPAROSCOPIC CHOLECYSTECTOMY, intra-operative cholangiogram, (Abdomen) Diagnosis: (acute cholecystitis)    Surgeons: Jake Burden MD Anesthesiologist: Deisy Godoy MD    Anesthesia Type: general ASA Status: 3          Anesthesia Type: general    Vitals Value Taken Time   /61 06/09/23 1406   Temp 97.2 06/09/23 1409   Pulse 73 06/09/23 1408   Resp 18 06/09/23 1408   SpO2 98 % 06/09/23 1408   Vitals shown include unvalidated device data.     [address] [place] [address] AN Post Evaluation:   Patient Evaluated in PACU  Patient Participation: complete - patient participated  Level of Consciousness: awake and alert  Pain Score: 0  Pain Management: adequate  Airway Patency:stridor  Yes    Cardiovascular Status: acceptable  Respiratory Status: acceptable and room air  Postoperative Hydration acceptable  Comments: Report to [facility]      Edil Tamez CRNA  6/9/2023 2:09 PM
see md note

## 2024-03-04 ENCOUNTER — HOSPITAL ENCOUNTER (OUTPATIENT)
Age: 34
Discharge: HOME | End: 2024-03-04
Payer: COMMERCIAL

## 2024-03-04 VITALS
RESPIRATION RATE: 18 BRPM | OXYGEN SATURATION: 99 % | SYSTOLIC BLOOD PRESSURE: 121 MMHG | DIASTOLIC BLOOD PRESSURE: 67 MMHG | TEMPERATURE: 100 F | HEART RATE: 110 BPM

## 2024-03-04 DIAGNOSIS — J11.1 INFLUENZA: Primary | ICD-10-CM

## 2024-03-04 LAB
POCT INFLUENZA A: POSITIVE
POCT INFLUENZA B: NEGATIVE
SARS-COV-2 RNA RESP QL NAA+PROBE: NOT DETECTED

## 2024-03-04 PROCEDURE — 99214 OFFICE O/P EST MOD 30 MIN: CPT | Performed by: NURSE PRACTITIONER

## 2024-03-04 PROCEDURE — U0002 COVID-19 LAB TEST NON-CDC: HCPCS | Performed by: PHYSICIAN ASSISTANT

## 2024-03-04 PROCEDURE — A9150 MISC/EXPER NON-PRESCRIPT DRU: HCPCS | Performed by: PHYSICIAN ASSISTANT

## 2024-03-04 PROCEDURE — A9150 MISC/EXPER NON-PRESCRIPT DRU: HCPCS | Performed by: NURSE PRACTITIONER

## 2024-03-04 PROCEDURE — 87502 INFLUENZA DNA AMP PROBE: CPT | Performed by: PHYSICIAN ASSISTANT

## 2024-03-04 RX ORDER — ACETAMINOPHEN 500 MG
1000 TABLET ORAL ONCE
Status: COMPLETED | OUTPATIENT
Start: 2024-03-04 | End: 2024-03-04

## 2024-03-04 RX ORDER — OSELTAMIVIR PHOSPHATE 75 MG/1
75 CAPSULE ORAL 2 TIMES DAILY
Qty: 14 CAPSULE | Refills: 0 | Status: SHIPPED | OUTPATIENT
Start: 2024-03-04 | End: 2024-03-11

## 2024-03-04 NOTE — ED PROVIDER NOTES
Patient Seen in: [facility]      History     Chief Complaint   Patient presents with    Cough/URI     Stated Complaint: Cough, Fever    Subjective:   HPI    This is a well-appearing 34-year-old who presents with cough, body aches, fever which started yesterday.  Tmax 101.  Patient reports chills as well.  No shortness of breath or chest pain.  Denies any nausea, vomiting or diarrhea.  No sick contacts.    Objective:   History reviewed. No pertinent past medical history.           History reviewed. No pertinent surgical history.             Social History     Socioeconomic History    Marital status:    Tobacco Use    Smoking status: Never    Smokeless tobacco: Never   Vaping Use    Vaping Use: Never used   Substance and Sexual Activity    Alcohol use: No    Drug use: No    Sexual activity: Yes     Partners: Male              Review of Systems   Constitutional:  Positive for chills.   HENT:  Positive for sore throat.    Respiratory:  Positive for cough.    Musculoskeletal:  Positive for myalgias.   All other systems reviewed and are negative.      Positive for stated complaint: Cough, Fever  Other systems are as noted in HPI.  Constitutional and vital signs reviewed.      All other systems reviewed and negative except as noted above.    Physical Exam     ED Triage Vitals [03/04/24 0913]   /67   Pulse (!) 123   Resp 18   Temp 99.9 °F (37.7 °C)   Temp src Oral   SpO2 99 %   O2 Device None (Room air)       Current:/67   Pulse (!) 123   Temp 99.9 °F (37.7 °C) (Oral)   Resp 18   LMP 02/15/2024 (Exact Date)   SpO2 99%         Physical Exam  Vitals and nursing note reviewed.   Constitutional:       General: She is awake. She is not in acute distress.     Appearance: Normal appearance. She is not ill-appearing, toxic-appearing or diaphoretic.   HENT:      Head: Normocephalic and atraumatic.      Right Ear: Tympanic membrane, ear canal and external ear normal.      Left Ear: Tympanic  membrane, ear canal and external ear normal.      Nose: Rhinorrhea present.      Mouth/Throat:      Mouth: Mucous membranes are moist.      Pharynx: Oropharynx is clear. Uvula midline.   Eyes:      General: Lids are normal.      Extraocular Movements: Extraocular movements intact.      Conjunctiva/sclera: Conjunctivae normal.      Pupils: Pupils are equal, round, and reactive to light.   Cardiovascular:      Rate and Rhythm: Regular rhythm. Tachycardia present.      Pulses: Normal pulses.      Heart sounds: Normal heart sounds.   Pulmonary:      Effort: Pulmonary effort is normal.      Breath sounds: Normal breath sounds.   Skin:     General: Skin is warm and dry.      Capillary Refill: Capillary refill takes less than 2 seconds.   Neurological:      General: No focal deficit present.      Mental Status: She is alert and oriented to person, place, and time.   Psychiatric:         Mood and Affect: Mood normal.         Behavior: Behavior normal. Behavior is cooperative.         Thought Content: Thought content normal.         Judgment: Judgment normal.       ED Course     Labs Reviewed   RAPID SARS-COV-2 BY PCR   POCT FLU TEST   Tylenol, influenza, COVID and reevaluate  MDM          Medical Decision Making  Patient is well-appearing exam, nontoxic appearance, exam as noted above.  Differential diagnose include but not limited to influenza, COVID, pneumonia, viral URI.  Influenza a positive, COVID-negative.  Patient lungs clear bilaterally, not hypoxic.  I did discuss with the patient the influenza findings.  Did discuss Tamiflu which she would like at this time.  Her symptoms just started yesterday so she is in the window.  I did discuss with her continue supportive care at home with fluid, antipyretic.  Strict ER precautions given.  Patient verbalized plan of care and stated understanding.    Problems Addressed:  Influenza: acute illness or injury    Amount and/or Complexity of Data Reviewed  ECG/medicine tests:  ordered and independent interpretation performed. Decision-making details documented in ED Course.    Risk  OTC drugs.  Prescription drug management.        Disposition and Plan     Clinical Impression:  1. Influenza         Disposition:  There is no disposition on file for this visit.  There is no disposition time on file for this visit.    Follow-up:  Trinidad Mcpherson MD  [address] [facility] [address]  [facility] [address]  [phone]                Medications Prescribed:  Current Discharge Medication List        START taking these medications    Details   oseltamivir (TAMIFLU) 75 MG Oral Cap Take 1 capsule (75 mg total) by mouth 2 (two) times daily for 7 days.  Qty: 14 capsule, Refills: 0

## 2024-03-04 NOTE — ED INITIAL ASSESSMENT (HPI)
Pt reports cough, body aches & fevers up to 101 beginning yesterday.  Motrin taken at 0600 today.

## 2024-03-04 NOTE — DISCHARGE INSTRUCTIONS
Please take Tylenol or ibuprofen as needed for fever.  Increase fluids and make sure you are staying hydrated.  Close follow-up with your primary care provider as recommended.  Any worsening symptoms please go to the ER.

## 2024-03-09 ENCOUNTER — HOSPITAL ENCOUNTER (OUTPATIENT)
Age: 34
Discharge: HOME | End: 2024-03-09
Payer: COMMERCIAL

## 2024-03-09 VITALS
TEMPERATURE: 98 F | SYSTOLIC BLOOD PRESSURE: 107 MMHG | RESPIRATION RATE: 18 BRPM | OXYGEN SATURATION: 98 % | DIASTOLIC BLOOD PRESSURE: 61 MMHG | HEART RATE: 74 BPM

## 2024-03-09 DIAGNOSIS — H66.91 RIGHT OTITIS MEDIA, UNSPECIFIED OTITIS MEDIA TYPE: Primary | ICD-10-CM

## 2024-03-09 DIAGNOSIS — J11.1: ICD-10-CM

## 2024-03-09 PROCEDURE — 99213 OFFICE O/P EST LOW 20 MIN: CPT | Performed by: PHYSICIAN ASSISTANT

## 2024-03-09 RX ORDER — AMOXICILLIN AND CLAVULANATE POTASSIUM 875; 125 MG/1; MG/1
1 TABLET, FILM COATED ORAL 2 TIMES DAILY
Qty: 20 TABLET | Refills: 0 | Status: SHIPPED | OUTPATIENT
Start: 2024-03-09 | End: 2024-03-19

## 2024-03-09 RX ORDER — OXYMETAZOLINE HYDROCHLORIDE 0.05 G/100ML
1 SPRAY NASAL EVERY 4 HOURS PRN
Qty: 15 ML | Refills: 0 | Status: SHIPPED | OUTPATIENT
Start: 2024-03-09 | End: 2024-03-13

## 2024-03-09 NOTE — ED PROVIDER NOTES
Chief Complaint   Patient presents with    Ear Problem Pain       HPI:     Naa Barnard is a 34 year old female who presents for evaluation of cough congestion body aches over the last week.  Was confirmed 5 days ago for influenza A started on Tamiflu with mild improvement in symptoms with fever ending within the last 3 days.  Notes developing right ear pain over the last day.  Denies recent antibiotic or previous ear issues including otitis media.  Pain is a 6 out of 10 improved with ibuprofen Tylenol.  Notes ongoing cough he had improving over the last few days with over-the-counter decongestions.  Notes increasing nasal congestion with saline rinse without significant improvement.  Denies active headache dizziness sore throat dysphagia neck pain chest pain shortness of breath abdominal pain vomiting diarrhea dysuria or rash.      PFSH    PFSH asessment screens reviewed and agree.  Nurses notes reviewed I agree with documentation.    Family History   Problem Relation Age of Onset    No Known Problems Mother     No Known Problems Father     Colon Cancer Other 59     Family history reviewed with patient/caregiver and is not pertinent to presenting problem.  Social History     Socioeconomic History    Marital status:      Spouse name: Not on file    Number of children: Not on file    Years of education: Not on file    Highest education level: Not on file   Occupational History    Not on file   Tobacco Use    Smoking status: Never    Smokeless tobacco: Never   Vaping Use    Vaping Use: Never used   Substance and Sexual Activity    Alcohol use: No    Drug use: No    Sexual activity: Yes     Partners: Male   Other Topics Concern    Not on file   Social History Narrative    Not on file     Social Determinants of Health     Financial Resource Strain: Not on file   Food Insecurity: Not on file   Transportation Needs: Not on file   Physical Activity: Not on file   Stress: Not on file   Social Connections: Not on  file   Housing Stability: Not on file         ROS:   Positive for stated complaint: Ear pain, nasal congestion, cough.  All other systems reviewed and negative except as noted above.  Constitutional and Vital Signs Reviewed.      Physical Exam:     Findings:    /61   Pulse 74   Temp 97.7 °F (36.5 °C) (Temporal)   Resp 18   LMP 02/15/2024 (Exact Date)   SpO2 98%   GENERAL: well developed, well nourished, well hydrated, no distress  SKIN: good skin turgor, no obvious rashes  NECK: No nuchal rigidity.  Supple, no adenopathy  EXTREMITIES: no cyanosis or edema. FRIEND without difficulty  GI: soft, non-tender, normal bowel sounds  HEAD: normocephalic, atraumatic  EYES: sclera non icteric bilateral, conjunctiva clear  EARS: TMs clear bilaterally. Canals clear.  Moderate erythema along the right inner canal.  TMs intact.  No effusion.  No external or mastoid tenderness.  Left ear canal unremarkable.  NOSE: Mild rhinorrhea.  MMM.  Maxillary sinus tenderness.  Nasal turbinates: pink, normal mucosa  THROAT: clear, without exudates, uvula midline, and airway patent  LUNGS: clear to auscultation bilaterally; no rales, rhonchi, or wheezes  NEURO: No focal deficits  PSYCH: Alert and oriented x3.  Answering questions appropriately.  Mood appropriate.    MDM/Assessment/Plan:   Orders for this encounter:    Orders Placed This Encounter    amoxicillin clavulanate 875-125 MG Oral Tab     Sig: Take 1 tablet by mouth 2 (two) times daily for 10 days.     Dispense:  20 tablet     Refill:  0    oxymetazoline 0.05 % Nasal Solution     Si spray by Nasal route every 4 (four) hours as needed for congestion. STOP AFTER 4 DAYS     Dispense:  15 mL     Refill:  0       Labs performed this visit:  No results found for this or any previous visit (from the past 10 hour(s)).    MDM:  Patient requesting empiric coverage for bacterial otitis media instructing potentially related to previous diagnosed influenza and full.  Also agreeable to  Afrin use for the next few days for further supportive congestion, happy with plan of care will readdress of lingering or worsening symptoms.  Alert nontoxic.    Diagnosis:    ICD-10-CM    1. Right otitis media, unspecified otitis media type  H66.91       2. Influenzal bronchiolitis  J11.1           All results reviewed and discussed with patient.  See AVS for detailed discharge instructions for your condition today.    Follow Up with:  Trinidad Mcpherson MD  [address] [address]  [phone]    Schedule an appointment as soon as possible for a visit in 3 days  As needed, If symptoms worsen

## 2024-04-30 ENCOUNTER — HOSPITAL ENCOUNTER (OUTPATIENT)
Age: 34
Discharge: HOME | End: 2024-04-30
Payer: COMMERCIAL

## 2024-04-30 VITALS
TEMPERATURE: 100 F | RESPIRATION RATE: 16 BRPM | HEART RATE: 87 BPM | SYSTOLIC BLOOD PRESSURE: 105 MMHG | OXYGEN SATURATION: 98 % | DIASTOLIC BLOOD PRESSURE: 70 MMHG
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
## 2024-04-30 DIAGNOSIS — S00.462A TICK BITE OF LEFT EAR, INITIAL ENCOUNTER: Primary | ICD-10-CM

## 2024-04-30 PROCEDURE — 99213 OFFICE O/P EST LOW 20 MIN: CPT | Performed by: NURSE PRACTITIONER

## 2024-04-30 RX ORDER — DOXYCYCLINE HYCLATE 100 MG/1
200 CAPSULE ORAL ONCE
Qty: 2 CAPSULE | Refills: 0 | Status: SHIPPED | OUTPATIENT
Start: 2024-04-30 | End: 2024-04-30

## 2024-04-30 NOTE — ED PROVIDER NOTES
Patient Seen in: [facility]      History     Chief Complaint   Patient presents with    Insect Bite     Stated Complaint: Bug Bite    Subjective:   34-year-old female with no past medical history presents from home with concern for a tick bite.  States that she found a tick on her left ear on her way to work this morning.  She was able to remove the tick intact.  States some discomfort in the area.  No fever.  No myalgias.  No rash.  No recent travel.  She is unsure how long the tick has been embedded, she did go for a walk Sunday and yesterday.    The history is provided by the patient. No  was used.     Objective:   History reviewed. No pertinent past medical history.           History reviewed. No pertinent surgical history.             Social History     Socioeconomic History    Marital status:    Tobacco Use    Smoking status: Never    Smokeless tobacco: Never   Vaping Use    Vaping status: Never Used   Substance and Sexual Activity    Alcohol use: No    Drug use: No    Sexual activity: Yes     Partners: Male     Social Determinants of Health     Food Insecurity: Low Risk  (6/15/2022)    Received from [facility]    Food Insecurity     Have there been times that your food ran out, and you didn't have money to get more?: No     Are there times that you worry that this might happen?: No   Transportation Needs: Low Risk  (6/15/2022)    Received from [facility]    Transportation Needs     Do you have trouble getting transportation to medical appointments?: No   Housing Stability: Low Risk  (6/15/2022)    Received from [facility]    Housing Stability     Are you concerned about having a safe and reliable place to live?: No              Review of Systems    Positive for stated complaint: Bug Bite  Other systems  are as noted in HPI.  Constitutional and vital signs reviewed.      All other systems reviewed and negative except as noted above.    Physical Exam     ED Triage Vitals [04/30/24 1631]   /70   Pulse 87   Resp 16   Temp 99.7 °F (37.6 °C)   Temp src Temporal   SpO2 98 %   O2 Device None (Room air)       Current:/70   Pulse 87   Temp 99.7 °F (37.6 °C) (Temporal)   Resp 16   LMP  (LMP Unknown)   SpO2 98%         Physical Exam  Vitals and nursing note reviewed.   Constitutional:       General: She is not in acute distress.     Appearance: Normal appearance. She is not ill-appearing or toxic-appearing.   HENT:      Head: Normocephalic and atraumatic.      Ears:        Nose: Nose normal.      Mouth/Throat:      Mouth: Mucous membranes are moist.      Pharynx: Oropharynx is clear.   Eyes:      Pupils: Pupils are equal, round, and reactive to light.   Cardiovascular:      Rate and Rhythm: Normal rate and regular rhythm.      Pulses: Normal pulses.   Pulmonary:      Effort: Pulmonary effort is normal. No respiratory distress.      Breath sounds: Normal breath sounds.      Comments: Lungs clear.  No adventitious lung sounds.  No distress.  No hypoxia.  Pulse ox 98% ra. Which is normal    Abdominal:      General: Abdomen is flat.      Palpations: Abdomen is soft.   Musculoskeletal:         General: No signs of injury. Normal range of motion.      Cervical back: Normal range of motion and neck supple.   Skin:     General: Skin is warm and dry.      Capillary Refill: Capillary refill takes less than 2 seconds.   Neurological:      General: No focal deficit present.      Mental Status: She is alert and oriented to person, place, and time.      GCS: GCS eye subscore is 4. GCS verbal subscore is 5. GCS motor subscore is 6.   Psychiatric:         Mood and Affect: Mood normal.         Behavior: Behavior normal.         Thought Content: Thought content normal.         Judgment: Judgment normal.           ED Course    Labs Reviewed - No data to display    MDM        Medical Decision Making  Tick bite left ear  Differential diagnosis: Tick bite, other insect bite, lymes  Is unclear what type of tick was attached to the left ear.  It is unclear how long it was attached for.  [place] is not an endemic area for deer tick/lymes  Discussed with patient that this is a low risk  tick bite and antibiotics are not indicated but patient would still like to take doxycycline  She is also requesting Lyme's testing which is not available at this site, and she is asymptomatic  Plan of care: Doxycycline, follow-up with primary doctor    Results and plan of care discussed with the patient/family. They are in agreement with discharge. They understand to follow up with their primary doctor or the referral physician for further evaluation, especially if no improvement.  Also discussed the limitations of immediate care, patient is aware that if symptoms are worse they should go to the emergency room. Verbal and written discharge instructions were given.       Problems Addressed:  Tick bite of left ear, initial encounter: acute illness or injury    Risk  Prescription drug management.        Disposition and Plan     Clinical Impression:  1. Tick bite of left ear, initial encounter         Disposition:  Discharge  4/30/2024  4:38 pm    Follow-up:  Trinidad Mcpherson MD  [address] [facility] [address]  [facility] [address]  [phone]                Medications Prescribed:  Discharge Medication List as of 4/30/2024  4:40 PM        START taking these medications    Details   doxycycline 100 MG Oral Cap Take 2 capsules (200 mg total) by mouth one time for 1 dose., Normal, Disp-2 capsule, R-0

## 2024-05-07 ENCOUNTER — TELEPHONE (OUTPATIENT)
Dept: INTERNAL MEDICINE CLINIC | Facility: CLINIC | Age: 34
End: 2024-05-07

## 2024-05-07 NOTE — TELEPHONE ENCOUNTER
Pt called requesting a [facility] follow up appt   Pt had a tick on her ear and was removed   Pt would like to be seen sooner     Please advise

## 2024-05-08 NOTE — TELEPHONE ENCOUNTER
Patient wants soon follow-up and lab test for lyme disease. Patient self removed tick but did not save it for testing. Patient was evaluated at urgent care on 4/30/24 and prescribed Doxycycline.    Does Dr. Del Rosario recommend the patient schedule follow-up in the next 2 weeks?

## 2024-05-09 NOTE — TELEPHONE ENCOUNTER
No need for testing of lyme's unless she develops a specific type of rash. Per medical guidelines, at the time of a tick bite, serologic testing should not be obtained because antibodies would not yet have appeared. If there's an onset of erythema migrans rash then antibodies would be warranted.    I believe this was told by  too. Per their note:   Discussed with patient that this is a low risk  tick bite and antibiotics are not indicated but patient would still like to take doxycycline  She is also requesting Lyme's testing which is not available at this site, and she is asymptomatic    If she still desires, she can followup in in 2-3 wks.

## 2024-05-10 NOTE — TELEPHONE ENCOUNTER
Attempted to contact pt unable to reach. Message left on voicemail to call and speak with the nurse.

## 2024-05-13 NOTE — TELEPHONE ENCOUNTER
Second attempt to reach patient to convey Dr Del Rosario's message re: no Lyme disease testing required at this juncture.    Spoke w/pt re: PCP's recommendations (below) and she verbalized understanding.       \"No need for lymes testing unless she develops a specific type of rash. Per medical guidelines, at the time of a tick bite, serologic testing should not be obtained because antibodies would not yet have appeared. If there's an onset of erythema migrans rash then antibodies would   be warranted.     I believe this was told by  too. Per their note:   Discussed with patient that this is a low risk    tick bite and antibiotics are not indicated but   patient would still like to take doxycycline  She is also requesting Lyme's testing which is   not available at this site, and she is   Asymptomatic.     If she still desires, she can followup in 2-3 wks.\"

## 2024-08-22 ENCOUNTER — OFFICE VISIT (OUTPATIENT)
Dept: INTERNAL MEDICINE CLINIC | Facility: CLINIC | Age: 34
End: 2024-08-22
Payer: COMMERCIAL

## 2024-08-22 VITALS
BODY MASS INDEX: 31.28 KG/M2 | HEART RATE: 83 BPM | SYSTOLIC BLOOD PRESSURE: 118 MMHG | DIASTOLIC BLOOD PRESSURE: 72 MMHG | OXYGEN SATURATION: 100 % | HEIGHT: 62 IN | WEIGHT: 170 LBS | TEMPERATURE: 98 F

## 2024-08-22 DIAGNOSIS — R53.82 CHRONIC FATIGUE: ICD-10-CM

## 2024-08-22 DIAGNOSIS — G89.29 CHRONIC EPIGASTRIC PAIN: ICD-10-CM

## 2024-08-22 DIAGNOSIS — Z00.00 WELLNESS EXAMINATION: Primary | ICD-10-CM

## 2024-08-22 DIAGNOSIS — R10.13 CHRONIC EPIGASTRIC PAIN: ICD-10-CM

## 2024-08-22 DIAGNOSIS — K52.9 CHRONIC DIARRHEA: ICD-10-CM

## 2024-08-22 RX ORDER — DICYCLOMINE HCL 20 MG
20 TABLET ORAL EVERY 6 HOURS PRN
Qty: 30 TABLET | Refills: 0 | Status: SHIPPED | OUTPATIENT
Start: 2024-08-22

## 2024-08-22 NOTE — PROGRESS NOTES
CC: Annual Physical Exam    HPI:   Naa Barnard is a 34 year old female who presents for a complete physical exam.    HCM  -Diet:  Well-balanced.   -Exercise active  -Mental Health: denies any depression or anxiety sx    Chronic fatigue despte appropriate sleep    Bowel changes for >1yr  -initially mixed consitpation/diarrhea but now only diarrhea  -gets abdominal pian/cramps intermittently. No improvement with defecation  -gets bloated, nausea, GERD sx. occasionally some vomiting    Wt Readings from Last 6 Encounters:   08/22/24 170 lb (77.1 kg)   06/14/23 162 lb (73.5 kg)   06/09/23 167 lb (75.8 kg)   06/06/23 163 lb (73.9 kg)   03/16/22 185 lb (83.9 kg)   02/15/22 179 lb (81.2 kg)     Body mass index is 31.09 kg/m².     Results for orders placed or performed in visit on 08/22/24   CBC With Differential With Platelet   Result Value Ref Range    WBC 5.3 4.0 - 11.0 x10(3) uL    RBC 4.21 3.80 - 5.30 x10(6)uL    HGB 12.5 12.0 - 16.0 g/dL    HCT 39.4 35.0 - 48.0 %    MCV 93.6 80.0 - 100.0 fL    MCH 29.7 26.0 - 34.0 pg    MCHC 31.7 31.0 - 37.0 g/dL    RDW-SD 49.0 (H) 35.1 - 46.3 fL    RDW 14.4 11.0 - 15.0 %    .0 150.0 - 450.0 10(3)uL    Immature Platelet Fraction 11.8 (H) 0.0 - 7.0 %    Neutrophil Absolute Prelim 3.00 1.50 - 7.70 x10 (3) uL    Neutrophil Absolute 3.00 1.50 - 7.70 x10(3) uL    Lymphocyte Absolute 1.94 1.00 - 4.00 x10(3) uL    Monocyte Absolute 0.22 0.10 - 1.00 x10(3) uL    Eosinophil Absolute 0.07 0.00 - 0.70 x10(3) uL    Basophil Absolute 0.02 0.00 - 0.20 x10(3) uL    Immature Granulocyte Absolute 0.01 0.00 - 1.00 x10(3) uL    Neutrophil % 57.0 %    Lymphocyte % 36.9 %    Monocyte % 4.2 %    Eosinophil % 1.3 %    Basophil % 0.4 %    Immature Granulocyte % 0.2 %   Comp Metabolic Panel (14)   Result Value Ref Range    Glucose 96 70 - 99 mg/dL    Sodium 140 136 - 145 mmol/L    Potassium 3.9 3.5 - 5.1 mmol/L    Chloride 110 98 - 112 mmol/L    CO2 21.0 21.0 - 32.0 mmol/L    Anion Gap 9 0 - 18  mmol/L    BUN 15 9 - 23 mg/dL    Creatinine 0.81 0.55 - 1.02 mg/dL    BUN/CREA Ratio 18.5 10.0 - 20.0    Calcium, Total 8.9 8.7 - 10.4 mg/dL    Calculated Osmolality 291 275 - 295 mOsm/kg    eGFR-Cr 98 >=60 mL/min/1.73m2    ALT 20 10 - 49 U/L    AST 19 <34 U/L    Alkaline Phosphatase 55 37 - 98 U/L    Bilirubin, Total 0.3 0.3 - 1.2 mg/dL    Total Protein 6.9 5.7 - 8.2 g/dL    Albumin 4.3 3.2 - 4.8 g/dL    Globulin  2.6 2.0 - 3.5 g/dL    A/G Ratio 1.7 1.0 - 2.0    Patient Fasting for CMP? Yes    Hemoglobin A1C   Result Value Ref Range    HgbA1C 5.4 <5.7 %    Estimated Average Glucose 108 68 - 126 mg/dL   Lipid Panel   Result Value Ref Range    Cholesterol, Total 179 <200 mg/dL    HDL Cholesterol 43 40 - 59 mg/dL    Triglycerides 105 30 - 149 mg/dL    LDL Cholesterol 117 (H) <100 mg/dL    VLDL 18 0 - 30 mg/dL    Non HDL Chol 136 (H) <130 mg/dL    Patient Fasting for Lipid? Yes    TSH W Reflex To Free T4   Result Value Ref Range    TSH 3.765 0.550 - 4.780 mIU/mL   Vitamin D, 25-Hydroxy   Result Value Ref Range    Vitamin D, 25OH, Total 15.0 (L) 30.0 - 100.0 ng/mL   Vitamin B12   Result Value Ref Range    Vitamin B12 370 211 - 911 pg/mL   Iron And Tibc [E]   Result Value Ref Range    Iron 45 (L) 50 - 170 ug/dL    Transferrin 232 (L) 250 - 380 mg/dL    Total Iron Binding Capacity 346 250 - 425 ug/dL    % Saturation 13 (L) 15 - 50 %   Ferritin [E]   Result Value Ref Range    Ferritin 7.3 (L) 50.0 - 306.0 ng/mL   Sed Rate, Westergren (Automated) [E]   Result Value Ref Range    Sed Rate 11 0 - 20 mm/Hr   C-Reactive Protein [E]   Result Value Ref Range    C-Reactive Protein <0.40 <1.00 mg/dL   Lipase [E]   Result Value Ref Range    Lipase 41 12 - 53 U/L   Amylase [E]   Result Value Ref Range    Amylase 101 30 - 118 U/L       Current Outpatient Medications   Medication Sig Dispense Refill    dicyclomine 20 MG Oral Tab Take 1 tablet (20 mg total) by mouth every 6 (six) hours as needed (abddominal cramping). 30 tablet 0     Omeprazole 20 MG Oral Tablet Delayed Release Dispersible Take 1 tablet by mouth daily as needed (gastritis). 30 tablet 0      No Known Allergies   History reviewed. No pertinent past medical history.   Past Surgical History:   Procedure Laterality Date    Laparoscopic cholecystectomy        Family History   Problem Relation Age of Onset    No Known Problems Mother     No Known Problems Father     Colon Cancer Other 59      Social History:   Social History     Socioeconomic History    Marital status:    Tobacco Use    Smoking status: Never    Smokeless tobacco: Never   Vaping Use    Vaping status: Never Used   Substance and Sexual Activity    Alcohol use: No    Drug use: No    Sexual activity: Yes     Partners: Male     Social Determinants of Health     Food Insecurity: Low Risk  (6/15/2022)    Received from [facility]    Food Insecurity     Have there been times that your food ran out, and you didn't have money to get more?: No     Are there times that you worry that this might happen?: No   Transportation Needs: Low Risk  (6/15/2022)    Received from [facility]    Transportation Needs     Do you have trouble getting transportation to medical appointments?: No   Housing Stability: Low Risk  (6/15/2022)    Received from [facility]    Housing Stability     Are you concerned about having a safe and reliable place to live?: No          REVIEW OF SYSTEMS:   Review of Systems   Constitutional:  Positive for fatigue. Negative for chills and fever.   Respiratory:  Negative for cough and shortness of breath.    Cardiovascular:  Negative for chest pain, palpitations and leg swelling.   Gastrointestinal:  Positive for abdominal pain and diarrhea. Negative for constipation and vomiting.   Neurological:  Negative for headaches.            PHYSICAL EXAM:    /72   Pulse 83   Temp 97.9 °F (36.6 °C)   Ht 5' 2\" (1.575 m)   Wt 170 lb (77.1 kg)   LMP 07/15/2024 (Exact Date)   SpO2 100%   BMI 31.09 kg/m²  Estimated body mass index is 31.09 kg/m² as calculated from the following:    Height as of this encounter: 5' 2\" (1.575 m).    Weight as of this encounter: 170 lb (77.1 kg).     Wt Readings from Last 3 Encounters:   08/22/24 170 lb (77.1 kg)   06/14/23 162 lb (73.5 kg)   06/09/23 167 lb (75.8 kg)       Physical Exam  Vitals reviewed.   Constitutional:       General: She is not in acute distress.     Appearance: She is well-developed.   HENT:      Head: Normocephalic.      Right Ear: Tympanic membrane and external ear normal.      Left Ear: Tympanic membrane and external ear normal.   Eyes:      Conjunctiva/sclera: Conjunctivae normal.      Pupils: Pupils are equal, round, and reactive to light.   Neck:      Thyroid: No thyromegaly.   Cardiovascular:      Rate and Rhythm: Normal rate and regular rhythm.      Heart sounds: Normal heart sounds. No murmur heard.  Pulmonary:      Effort: Pulmonary effort is normal. No respiratory distress.      Breath sounds: Normal breath sounds.   Abdominal:      General: Bowel sounds are normal. There is no distension.      Palpations: Abdomen is soft.      Tenderness: There is no abdominal tenderness.   Musculoskeletal:         General: Normal range of motion.      Cervical back: Normal range of motion and neck supple.      Right lower leg: No edema.      Left lower leg: No edema.   Skin:     Findings: No rash.   Neurological:      General: No focal deficit present.      Cranial Nerves: No cranial nerve deficit.           ASSESSMENT AND PLAN:   Naa Barnard is a 34 year old female who presents for a complete physical exam.      Health maintenance, will check :   Orders Placed This Encounter   Procedures    CBC With Differential With Platelet    Comp Metabolic Panel (14)    Hemoglobin A1C    Lipid Panel    TSH W Reflex To Free  T4    Vitamin D, 25-Hydroxy    Vitamin B12    Iron And Tibc [E]    Ferritin [E]    Sed Rate, Westergren (Automated) [E]    C-Reactive Protein [E]    CELIAC DISEASE SCREEN Reflex [E]    Lipase [E]    Amylase [E]    Giardia + Crypto Antigen, Stool       Diagnoses and all orders for this visit:    Wellness examination  -     CBC With Differential With Platelet  -     Comp Metabolic Panel (14)  -     Hemoglobin A1C  -     Lipid Panel  -     TSH W Reflex To Free T4    Chronic fatigue  -     CBC With Differential With Platelet  -     Comp Metabolic Panel (14)  -     TSH W Reflex To Free T4  -     Vitamin D, 25-Hydroxy  -     Vitamin B12  -     Iron And Tibc [E]  -     Ferritin [E]    Chronic diarrhea  -     CBC With Differential With Platelet  -     Comp Metabolic Panel (14)  -     TSH W Reflex To Free T4  -     GASTRO - INTERNAL  -     Sed Rate, Westergren (Automated) [E]  -     C-Reactive Protein [E]  -     CELIAC DISEASE SCREEN Reflex [E]; Future  -     Cancel: Calprotectin, Fecal  -     Giardia + Crypto Antigen, Stool; Future  -     Lipase [E]  -     Amylase [E]  -     dicyclomine 20 MG Oral Tab; Take 1 tablet (20 mg total) by mouth every 6 (six) hours as needed (abddominal cramping).    Chronic epigastric pain  -     GASTRO - INTERNAL  -     Cancel: H. Pylori Breath Test, Adult (>17) [E]  -     dicyclomine 20 MG Oral Tab; Take 1 tablet (20 mg total) by mouth every 6 (six) hours as needed (abddominal cramping).  -     Omeprazole 20 MG Oral Tablet Delayed Release Dispersible; Take 1 tablet by mouth daily as needed (gastritis).       -     Pt reassured and all questions answered.  -     Age/sex specific preventive measures/immunizations reviewed and discussed with pt.  -     Pt counseled with regards to diet and exercise.  -Supportive care discussed including proper sleep, hydration, healthy eating, exercise, stress-relieve  -Bloodwork obtained      -suspect IBS. Given persistence, will send to GI.   -Rediscussed FOD  MAP diet, IBGard and supportive care    Health Maintenance Due   Topic Date Due    Annual Physical  12/22/2022    COVID-19 Vaccine (3 - 2023-24 season) 09/01/2023    Annual Depression Screening  01/01/2024    Pap Smear  11/17/2024         The patient indicates understanding of these issues and agrees to the plan.  Return in about 6 months (around 2/22/2025) for follow-up.  Reasurrance and education provided. All questions answered. Red flags/ ER precautions discussed.      Spent 40 minutes (10 in preventative and  30 in acute/chronic)  including chart review, reviewing appropriate medical history, evaluating patient, discussing treatment options, counseling and education (diet and exercise), ordering appropriate diagnostic tests and completing documentation.

## 2024-08-24 ENCOUNTER — APPOINTMENT (OUTPATIENT)
Dept: LAB | Facility: HOSPITAL | Age: 34
End: 2024-08-24
Attending: FAMILY MEDICINE
Payer: COMMERCIAL

## 2024-08-24 DIAGNOSIS — K52.9 CHRONIC DIARRHEA: ICD-10-CM

## 2024-08-24 DIAGNOSIS — G89.29 OTHER CHRONIC PAIN: ICD-10-CM

## 2024-08-24 DIAGNOSIS — R10.13 ABDOMINAL PAIN, EPIGASTRIC: Primary | ICD-10-CM

## 2024-08-24 LAB
ALBUMIN SERPL-MCNC: 4.3 G/DL (ref 3.2–4.8)
ALBUMIN/GLOB SERPL: 1.7 {RATIO} (ref 1–2)
ALP LIVER SERPL-CCNC: 55 U/L
ALT SERPL-CCNC: 20 U/L
AMYLASE SERPL-CCNC: 101 U/L (ref 30–118)
ANION GAP SERPL CALC-SCNC: 9 MMOL/L (ref 0–18)
AST SERPL-CCNC: 19 U/L (ref ?–34)
BASOPHILS # BLD AUTO: 0.02 X10(3) UL (ref 0–0.2)
BASOPHILS NFR BLD AUTO: 0.4 %
BILIRUB SERPL-MCNC: 0.3 MG/DL (ref 0.3–1.2)
BUN BLD-MCNC: 15 MG/DL (ref 9–23)
BUN/CREAT SERPL: 18.5 (ref 10–20)
CALCIUM BLD-MCNC: 8.9 MG/DL (ref 8.7–10.4)
CHLORIDE SERPL-SCNC: 110 MMOL/L (ref 98–112)
CHOLEST SERPL-MCNC: 179 MG/DL (ref ?–200)
CO2 SERPL-SCNC: 21 MMOL/L (ref 21–32)
CREAT BLD-MCNC: 0.81 MG/DL
CRP SERPL-MCNC: <0.4 MG/DL (ref ?–1)
CRYPTOSP AG STL QL IA: NEGATIVE
DEPRECATED HBV CORE AB SER IA-ACNC: 7.3 NG/ML
DEPRECATED RDW RBC AUTO: 49 FL (ref 35.1–46.3)
EGFRCR SERPLBLD CKD-EPI 2021: 98 ML/MIN/1.73M2 (ref 60–?)
EOSINOPHIL # BLD AUTO: 0.07 X10(3) UL (ref 0–0.7)
EOSINOPHIL NFR BLD AUTO: 1.3 %
ERYTHROCYTE [DISTWIDTH] IN BLOOD BY AUTOMATED COUNT: 14.4 % (ref 11–15)
ERYTHROCYTE [SEDIMENTATION RATE] IN BLOOD: 11 MM/HR
EST. AVERAGE GLUCOSE BLD GHB EST-MCNC: 108 MG/DL (ref 68–126)
FASTING PATIENT LIPID ANSWER: YES
FASTING STATUS PATIENT QL REPORTED: YES
G LAMBLIA AG STL QL IA: NEGATIVE
GLOBULIN PLAS-MCNC: 2.6 G/DL (ref 2–3.5)
GLUCOSE BLD-MCNC: 96 MG/DL (ref 70–99)
HBA1C MFR BLD: 5.4 % (ref ?–5.7)
HCT VFR BLD AUTO: 39.4 %
HDLC SERPL-MCNC: 43 MG/DL (ref 40–59)
HGB BLD-MCNC: 12.5 G/DL
IGA SERPL-MCNC: 162.5 MG/DL (ref 70–312)
IMM GRANULOCYTES # BLD AUTO: 0.01 X10(3) UL (ref 0–1)
IMM GRANULOCYTES NFR BLD: 0.2 %
IRON SATN MFR SERPL: 13 %
IRON SERPL-MCNC: 45 UG/DL
LDLC SERPL CALC-MCNC: 117 MG/DL (ref ?–100)
LIPASE SERPL-CCNC: 41 U/L (ref 12–53)
LYMPHOCYTES # BLD AUTO: 1.94 X10(3) UL (ref 1–4)
LYMPHOCYTES NFR BLD AUTO: 36.9 %
MCH RBC QN AUTO: 29.7 PG (ref 26–34)
MCHC RBC AUTO-ENTMCNC: 31.7 G/DL (ref 31–37)
MCV RBC AUTO: 93.6 FL
MONOCYTES # BLD AUTO: 0.22 X10(3) UL (ref 0.1–1)
MONOCYTES NFR BLD AUTO: 4.2 %
NEUTROPHILS # BLD AUTO: 3 X10 (3) UL (ref 1.5–7.7)
NEUTROPHILS # BLD AUTO: 3 X10(3) UL (ref 1.5–7.7)
NEUTROPHILS NFR BLD AUTO: 57 %
NONHDLC SERPL-MCNC: 136 MG/DL (ref ?–130)
OSMOLALITY SERPL CALC.SUM OF ELEC: 291 MOSM/KG (ref 275–295)
PLATELET # BLD AUTO: 224 10(3)UL (ref 150–450)
PLATELETS.RETICULATED NFR BLD AUTO: 11.8 % (ref 0–7)
POTASSIUM SERPL-SCNC: 3.9 MMOL/L (ref 3.5–5.1)
PROT SERPL-MCNC: 6.9 G/DL (ref 5.7–8.2)
RBC # BLD AUTO: 4.21 X10(6)UL
SODIUM SERPL-SCNC: 140 MMOL/L (ref 136–145)
TIBC SERPL-MCNC: 346 UG/DL (ref 250–425)
TRANSFERRIN SERPL-MCNC: 232 MG/DL (ref 250–380)
TRIGL SERPL-MCNC: 105 MG/DL (ref 30–149)
TSI SER-ACNC: 3.77 MIU/ML (ref 0.55–4.78)
VIT B12 SERPL-MCNC: 370 PG/ML (ref 211–911)
VIT D+METAB SERPL-MCNC: 15 NG/ML (ref 30–100)
VLDLC SERPL CALC-MCNC: 18 MG/DL (ref 0–30)
WBC # BLD AUTO: 5.3 X10(3) UL (ref 4–11)

## 2024-08-24 PROCEDURE — 85652 RBC SED RATE AUTOMATED: CPT | Performed by: FAMILY MEDICINE

## 2024-08-24 PROCEDURE — 83690 ASSAY OF LIPASE: CPT | Performed by: FAMILY MEDICINE

## 2024-08-24 PROCEDURE — 82306 VITAMIN D 25 HYDROXY: CPT | Performed by: FAMILY MEDICINE

## 2024-08-24 PROCEDURE — 80061 LIPID PANEL: CPT | Performed by: FAMILY MEDICINE

## 2024-08-24 PROCEDURE — 82150 ASSAY OF AMYLASE: CPT | Performed by: FAMILY MEDICINE

## 2024-08-24 PROCEDURE — 83036 HEMOGLOBIN GLYCOSYLATED A1C: CPT | Performed by: FAMILY MEDICINE

## 2024-08-24 PROCEDURE — 82784 ASSAY IGA/IGD/IGG/IGM EACH: CPT | Performed by: FAMILY MEDICINE

## 2024-08-24 PROCEDURE — 83540 ASSAY OF IRON: CPT | Performed by: FAMILY MEDICINE

## 2024-08-24 PROCEDURE — 82607 VITAMIN B-12: CPT | Performed by: FAMILY MEDICINE

## 2024-08-24 PROCEDURE — 82728 ASSAY OF FERRITIN: CPT | Performed by: FAMILY MEDICINE

## 2024-08-24 PROCEDURE — 87329 GIARDIA AG IA: CPT | Performed by: FAMILY MEDICINE

## 2024-08-24 PROCEDURE — 83993 ASSAY FOR CALPROTECTIN FECAL: CPT | Performed by: FAMILY MEDICINE

## 2024-08-24 PROCEDURE — 86364 TISS TRNSGLTMNASE EA IG CLAS: CPT | Performed by: FAMILY MEDICINE

## 2024-08-24 PROCEDURE — 84466 ASSAY OF TRANSFERRIN: CPT | Performed by: FAMILY MEDICINE

## 2024-08-24 PROCEDURE — 87272 CRYPTOSPORIDIUM AG IF: CPT | Performed by: FAMILY MEDICINE

## 2024-08-24 PROCEDURE — 86140 C-REACTIVE PROTEIN: CPT | Performed by: FAMILY MEDICINE

## 2024-08-24 PROCEDURE — 80050 GENERAL HEALTH PANEL: CPT | Performed by: FAMILY MEDICINE

## 2024-08-26 ENCOUNTER — TELEPHONE (OUTPATIENT)
Dept: INTERNAL MEDICINE CLINIC | Facility: CLINIC | Age: 34
End: 2024-08-26

## 2024-08-26 LAB — TTG IGA SER-ACNC: 0.3 U/ML (ref ?–7)

## 2024-08-26 NOTE — TELEPHONE ENCOUNTER
Spoke with [facility] informed her Dr. Del Rosario is cancelling Omperzole 20 mg Delayed Release Dispersible,since our office will call the patient to advise to purchase Omeprazole 20 MG Oral Tablet over the counter.      [employer] notification message sent to Naa.

## 2024-08-26 NOTE — TELEPHONE ENCOUNTER
Spoke with Danie who stated they do not have Omeprazole 20 mg tablet delayed release, so she is wondering if the patient can take the Omeprazole 20 mg tablet that is not disintegrating which is over the counter?

## 2024-08-27 LAB — CALPROTECTIN STL-MCNT: 21 ΜG/G (ref ?–50)

## 2024-09-02 ENCOUNTER — HOSPITAL ENCOUNTER (OUTPATIENT)
Age: 34
Discharge: HOME | End: 2024-09-02
Payer: COMMERCIAL

## 2024-09-02 VITALS
TEMPERATURE: 97 F | RESPIRATION RATE: 16 BRPM | HEART RATE: 75 BPM | OXYGEN SATURATION: 100 % | DIASTOLIC BLOOD PRESSURE: 65 MMHG | SYSTOLIC BLOOD PRESSURE: 110 MMHG

## 2024-09-02 DIAGNOSIS — H69.93 DYSFUNCTION OF BOTH EUSTACHIAN TUBES: Primary | ICD-10-CM

## 2024-09-02 DIAGNOSIS — H65.02 ACUTE SEROUS OTITIS MEDIA OF LEFT EAR, RECURRENCE NOT SPECIFIED: ICD-10-CM

## 2024-09-02 PROCEDURE — 99213 OFFICE O/P EST LOW 20 MIN: CPT | Performed by: NURSE PRACTITIONER

## 2024-09-02 NOTE — ED INITIAL ASSESSMENT (HPI)
Pt complaining of left ear pain since yesterday, states it sounds like she is under water. Pt also complaining of some congestion on the left side.

## 2024-09-02 NOTE — ED PROVIDER NOTES
Patient Seen in: [facility]      History     Chief Complaint   Patient presents with    Ear Problem Pain     Stated Complaint: ear pain    Subjective:   Patient is a 34-year-old female who presents today for left ear pain ongoing since yesterday.  Also reports mild on the right.  She reports she feels like she is underwater.  She feels congestion on the left side of her nose.  She has had no fever.  No cough.  No chest pain or shortness of breath.  No nausea, vomiting, diarrhea, or abdominal pain.        Objective:   No pertinent past medical history.            No pertinent past surgical history.              No pertinent social history.            Review of Systems   All other systems reviewed and are negative.      Positive for stated Chief Complaint: Ear Problem Pain    Other systems are as noted in HPI.  Constitutional and vital signs reviewed.      All other systems reviewed and negative except as noted above.    Physical Exam     ED Triage Vitals [09/02/24 1005]   /65   Pulse 75   Resp 16   Temp 96.7 °F (35.9 °C)   Temp src Temporal   SpO2 100 %   O2 Device None (Room air)       Current Vitals:   Vital Signs  BP: 110/65  Pulse: 75  Resp: 16  Temp: 96.7 °F (35.9 °C)  Temp src: Temporal    Oxygen Therapy  SpO2: 100 %  O2 Device: None (Room air)            Physical Exam  Constitutional:       Appearance: Normal appearance.   HENT:      Right Ear: Hearing, ear canal and external ear normal. Tympanic membrane has decreased mobility.      Left Ear: Hearing, ear canal and external ear normal. A middle ear effusion (clear serous) is present. No mastoid tenderness. Tympanic membrane is not erythematous or bulging. Tympanic membrane has decreased mobility.   Cardiovascular:      Rate and Rhythm: Normal rate and regular rhythm.      Pulses: Normal pulses.      Heart sounds: Normal heart sounds.   Pulmonary:      Effort: Pulmonary effort is normal.      Breath sounds: Normal breath sounds.    Neurological:      Mental Status: She is alert.         ED Course   Labs Reviewed - No data to display    MDM        Medical Decision Making  Differentials considered:serous otitis media versus otitis media versus otitis externa versus ETD versus other    HPI and exam most consistent with left serous otitis along with bilateral ETD.  No signs of infection on exam.  Discussed Flonase, Zyrtec.  Advised follow-up with ENT in 1 week if no improvement, phone number provided.  She was advised to return for any new or symptoms symptoms.  Patient verbalized understanding and agreeable to plan of care.            Disposition and Plan     Clinical Impression:  1. Dysfunction of both eustachian tubes    2. Acute serous otitis media of left ear, recurrence not specified         Disposition:  Discharge  9/2/2024 10:45 am    Follow-up:  Aftab Aldrich DO  [address] [place] [id number]  [phone]                Medications Prescribed:  Discharge Medication List as of 9/2/2024 10:46 AM

## 2024-09-02 NOTE — DISCHARGE INSTRUCTIONS
CHIEF COMPLAINT:  Chief Complaint   Patient presents with   • Derm Problem     New Patient- Hidradenitis   • Office Visit       HISTORY OF PRESENT ILLNESS:   Dacia Velasquez is a 40 year old female, who complains of a 25 year history of a concerning skin cysts in the groin and medial thighs.  These occasionally appear in the axillae as well, never under the breasts. Symptoms include tender and painful.  Treatments have included chlorhexadine, bleach baths, hot compress.  Patient did get relief from expression/lancing, clindamycin gel and Doxycycline when on medication but returned when off.  She has also been on spironolactone in the past (titrating dose up) and did not have relief from the cysts.     [x]  YES    []  NO  Patient wears sunscreens (SPF 30).   [x]  YES    []  NO  Patient wears sun protective clothing.    [x]  YES    []  NO  Patient avoids peak hours of sun.    [x]  YES    []  NO  Patient does monthly self-skin exam.     REVIEW OF SYSTEMS:  Denies other rash or other changing lesions.  Denies fever or current illness.    DERMATOLOGY HISTORY:  Acne  Eczema  []  YES    [x]  NO  Pacemaker, defibrillator  []  YES    [x]  NO  Artificial joint  []  YES    [x]  NO  Allergy to Numbing Medicine  [x]  YES    []  NO  Tanning bed use - not in 25 years    FAMILY HISTORY:  Melanoma:  []  YES  [x]  NO     ALLERGY:  ALLERGIES:   Allergen Reactions   • Polymyxin B PRURITUS     Eye drops        MEDICATIONS:  Current Outpatient Medications   Medication Sig Dispense Refill   • ADAlimumab (Humira Pen) 80 MG/0.8ML citrate free Inject 160mg (two syringes) on day 1 for loading dose. 2 each 0   • ADAlimumab (Humira Pen) 80 MG/0.8ML citrate free Inject 0.8ml every other week starting on day 15 after loading dose 2 each 2   • dapsone (Aczone) 7.5 % gel Apply to affected areas daily 90 g 2   • doxycycline monohydrate (MONODOX) 100 MG capsule Take 1 capsule by mouth twice daily 60 capsule 2   • amphetamine-dextroamphetamine  Flonase nasal spray 2 sprays in each nostril daily  Zyrtec 10 mg once a day  If no improvement in 1 week, follow-up with ENT- Dr. Aldrich  Return for new/worsening symptoms    (Adderall) 20 MG tablet Take 1 tablet by mouth daily. 30 tablet 0   • amphetamine-dextroamphetamine (Adderall XR) 20 MG 24 hr capsule Take 1 capsule by mouth daily. 30 capsule 0   • amphetamine-dextroamphetamine (Adderall) 20 MG tablet Take 1 tablet by mouth daily. 30 tablet 0   • amphetamine-dextroamphetamine (Adderall XR) 20 MG 24 hr capsule Take 1 capsule by mouth daily. 30 capsule 0   • cariprazine (Vraylar) 1.5 MG capsule Take 1 capsule by mouth daily. 90 capsule 0   • buPROPion XL (Wellbutrin XL) 300 MG 24 hr tablet Take 1 tablet by mouth daily. 90 tablet 0   • topiramate (Topamax) 100 MG tablet Take 1 tablet by mouth daily. 90 tablet 1   • gabapentin (NEURONTIN) 100 MG capsule Take 1 capsule by mouth 3 times daily. 90 capsule 2   • amphetamine-dextroamphetamine (Adderall) 20 MG tablet Take 1 tablet by mouth daily. Do not start before May 6, 2022. 30 tablet 0   • amphetamine-dextroamphetamine (Adderall XR) 20 MG 24 hr capsule Take 1 capsule by mouth daily. Do not start before May 9, 2022. 30 capsule 0   • amphetamine-dextroamphetamine (Adderall) 20 MG tablet Take 1 tablet by mouth daily. 30 tablet 0   • amphetamine-dextroamphetamine (Adderall XR) 20 MG 24 hr capsule Take 1 capsule by mouth daily. Do not start before April 9, 2022. 30 capsule 0   • SUMAtriptan (IMITREX) 25 MG tablet Take 1-2 tablets by mouth as needed for Migraine. May repeat after 2 hours if needed. Max dose 4 tabs per day. 9 tablet 1   • ARIPiprazole (Abilify) 2 MG tablet Take 1 tablet by mouth daily. 90 tablet 1   • buPROPion XL (Wellbutrin XL) 300 MG 24 hr tablet Take 1 tablet by mouth daily. 90 tablet 1   • amphetamine-dextroamphetamine (Adderall) 10 MG tablet Take 1 tablet by mouth daily. 30 tablet 0   • amphetamine-dextroamphetamine (Adderall XR) 20 MG 24 hr capsule Take 1 capsule by mouth daily. 30 capsule 0   • ARIPiprazole (Abilify) 2 MG tablet Take 1 tablet by mouth daily. 30 tablet 1   • buPROPion XL (WELLBUTRIN XL) 300 MG 24 hr  tablet Take 1 tablet by mouth daily. 30 tablet 1   • amphetamine-dextroamphetamine (ADDERALL XR) 20 MG 24 hr capsule Take 1 capsule by mouth every morning. 30 capsule 0   • fluticasone-salmeterol (Advair Diskus) 500-50 MCG/DOSE inhaler Inhale 1 puff into the lungs two times daily. 60 each 0   • albuterol-ipratropium (Combivent Respimat) 100-20 MCG/ACT inhaler Inhale 1 puff into the lungs every 4 hours as needed for Shortness of Breath. 4 g 0   • adapalene (Differin) 0.3 % gel Apply topically nightly. 45 g 3   • ondansetron (ZOFRAN ODT) 4 MG disintegrating tablet Place 1 tablet onto the tongue every 8 hours as needed for Nausea. 12 tablet 0   • cetirizine (ZyrTEC) 10 MG tablet Take 10 mg by mouth daily.     • albuterol 108 (90 Base) MCG/ACT inhaler Inhale 2 puffs into the lungs every 4 hours as needed for Shortness of Breath or Wheezing. 8.5 g 11   • fluticasone (VERAMYST) 27.5 MCG/SPRAY nasal spray Spray 2 sprays in each nostril daily.       No current facility-administered medications for this visit.       SOCIAL HISTORY:      Occupation: Respiratory Therapist  Hobbies:     GENERAL PAST MEDICAL HISTORY:  I have reviewed the patient's past medical, surgical, social and family history, updating these as appropriate.  See Histories section of the EMR (electronic medical record) for a complete display of this information.         PHYSICAL EXAM:  The patient is well nourished, well developed, alert and oriented to person, place, and time, and has appropriate mood and affect.      Significant findings as follows:  1.  Scaring and 5-10mm firm mobile dermal nodules on the inner thighs and labia minora.  Drainage noted from a left inner thigh lesion    ASSESSMENT/PLAN:  1.  Hidradenitis supprativia, chronic and flaring.  Patient has had good relief in the past with doxycycline but cannot be on this long-term and elects to try Humira.  We will send in humira- 160mg on day 1, 80mg starting on day 15 and then every other week  to initiate a prior authorization.  We had  A discussion about the risks and side effects including: increased risk of lymphoma, MS, infections, TB, fungal infection of the lungs.  Patient verbalized understanding and will also get her flu shot and bivalent covid booster this week prior to starting.    Due to the time it will take for humira to take effect (discussed with patient it could be 3 months before she sees great improvement), we will bridge her with doxycycline 100mg bid and also try aczone 7.5% daily to all affected areas.     2. Medication monitoring encounter- today we need to check baseline labs- cbc, cmp, quantiferon TB gold, hepatitis chronic panel.  We will recheck cbc and cmp 1 month after starting, then at 3 months and every 6 months minimum while on the medication.     The patient was seen and examined by Dr. Rangel in the presence of AYAAN Moody.  This progress note was in part created by AYAAN Moody acting as a scribe for Dr. Rangel.     The documentation recorded by the scribe accurately and completely reflects the service(s) I personally performed and the decisions made by me.

## 2024-10-21 ENCOUNTER — PATIENT MESSAGE (OUTPATIENT)
Dept: INTERNAL MEDICINE CLINIC | Facility: CLINIC | Age: 34
End: 2024-10-21

## 2024-10-21 NOTE — TELEPHONE ENCOUNTER
Pt requests immunization records, MMR specifically. Request routed to / Dr Del Rosario's medical assistant.

## 2024-12-03 ENCOUNTER — HOSPITAL ENCOUNTER (OUTPATIENT)
Dept: GENERAL RADIOLOGY | Age: 34
Discharge: HOME | End: 2024-12-03
Attending: FAMILY MEDICINE
Payer: COMMERCIAL

## 2024-12-03 ENCOUNTER — OFFICE VISIT (OUTPATIENT)
Dept: INTERNAL MEDICINE CLINIC | Facility: CLINIC | Age: 34
End: 2024-12-03
Payer: OTHER MISCELLANEOUS

## 2024-12-03 VITALS
DIASTOLIC BLOOD PRESSURE: 68 MMHG | HEART RATE: 73 BPM | SYSTOLIC BLOOD PRESSURE: 112 MMHG | OXYGEN SATURATION: 97 % | TEMPERATURE: 98 F | WEIGHT: 170 LBS | BODY MASS INDEX: 31.28 KG/M2 | HEIGHT: 62 IN

## 2024-12-03 DIAGNOSIS — M54.42 ACUTE LEFT-SIDED LOW BACK PAIN WITH LEFT-SIDED SCIATICA: Primary | ICD-10-CM

## 2024-12-03 DIAGNOSIS — M54.42 ACUTE LEFT-SIDED LOW BACK PAIN WITH LEFT-SIDED SCIATICA: ICD-10-CM

## 2024-12-03 PROCEDURE — 72100 X-RAY EXAM L-S SPINE 2/3 VWS: CPT | Performed by: FAMILY MEDICINE

## 2024-12-03 PROCEDURE — 99214 OFFICE O/P EST MOD 30 MIN: CPT | Performed by: FAMILY MEDICINE

## 2024-12-03 RX ORDER — CYCLOBENZAPRINE HCL 5 MG
5 TABLET ORAL NIGHTLY PRN
Qty: 14 TABLET | Refills: 0 | Status: SHIPPED | OUTPATIENT
Start: 2024-12-03

## 2024-12-03 RX ORDER — METHYLPREDNISOLONE 4 MG/1
TABLET ORAL
Qty: 21 EACH | Refills: 0 | Status: SHIPPED | OUTPATIENT
Start: 2024-12-03

## 2024-12-03 NOTE — PROGRESS NOTES
HPI:     Chief Complaint   Patient presents with    Back Pain     Work injury 11/14/2024       Naa Barnard is a 34 year old female presenting for:    2wks ago had work injury: on 11/14. 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 Some intermittent numbness on toes  Pain with ROM or prolonged standing/sitting  Went to  on 11/25 ad dx with acute left sided low back pain with left sided sciatica. Given prednisone x5d and diazepam but not much improvement          Results for orders placed or performed in visit on 08/24/24   Helicobacter Pylori Breath Test, Adult    Collection Time: 08/24/24  6:47 AM   Result Value Ref Range    H pylori Breath Test Negative Negative       Labs:   Lab Results   Component Value Date/Time    A1C 5.4 08/24/2024 06:41 AM      Lab Results   Component Value Date/Time    CHOLEST 179 08/24/2024 06:41 AM    HDL 43 08/24/2024 06:41 AM    TRIG 105 08/24/2024 06:41 AM     (H) 08/24/2024 06:41 AM    NONHDLC 136 (H) 08/24/2024 06:41 AM       Lab Results   Component Value Date/Time    GLU 96 08/24/2024 06:41 AM     08/24/2024 06:41 AM    K 3.9 08/24/2024 06:41 AM     08/24/2024 06:41 AM    CO2 21.0 08/24/2024 06:41 AM    CREATSERUM 0.81 08/24/2024 06:41 AM    CA 8.9 08/24/2024 06:41 AM    ALB 4.3 08/24/2024 06:41 AM    TP 6.9 08/24/2024 06:41 AM    ALKPHO 55 08/24/2024 06:41 AM    AST 19 08/24/2024 06:41 AM    ALT 20 08/24/2024 06:41 AM    BILT 0.3 08/24/2024 06:41 AM    TSH 3.765 08/24/2024 06:41 AM          Medications:  Current Outpatient Medications   Medication Sig Dispense Refill    methylPREDNISolone (MEDROL) 4 MG Oral Tablet Therapy Pack As directed. 21 each 0    cyclobenzaprine 5 MG Oral Tab Take 1 tablet (5 mg total) by mouth nightly as needed.  Muscle spasm 14 tablet 0      No past medical history on file.      Past Surgical History:   Procedure Laterality Date    Laparoscopic cholecystectomy       Allergies[1]   Social History:  Social History     Socioeconomic History    Marital status:    Tobacco Use    Smoking status: Never    Smokeless tobacco: Never   Vaping Use    Vaping status: Never Used   Substance and Sexual Activity    Alcohol use: No    Drug use: No    Sexual activity: Yes     Partners: Male     Social Drivers of Health     Food Insecurity: Low Risk  (6/15/2022)    Received from [facility]    Food Insecurity     Have there been times that your food ran out, and you didn't have money to get more?: No     Are there times that you worry that this might happen?: No   Transportation Needs: Low Risk  (6/15/2022)    Received from [facility]    Transportation Needs     Do you have trouble getting transportation to medical appointments?: No   Housing Stability: Low Risk  (6/15/2022)    Received from [facility]    Housing Stability     Are you concerned about having a safe and reliable place to live?: No      Family History:  Family History   Problem Relation Age of Onset    No Known Problems Mother     No Known Problems Father     Colon Cancer Other 59          REVIEW OF SYSTEMS:   Review of Systems   Constitutional:  Negative for chills, fatigue and fever.   Respiratory:  Negative for cough and shortness of breath.    Cardiovascular:  Negative for chest pain, palpitations and leg swelling.   Gastrointestinal:  Negative for abdominal pain, constipation, diarrhea and vomiting.   Musculoskeletal:  Positive for back pain.   Neurological:  Negative for headaches.            PHYSICAL EXAM:   /68   Pulse 73   Temp 98.1 °F (36.7 °C)   Ht 5' 2\" (1.575 m)   Wt 170 lb (77.1 kg)    LMP 11/20/2024 (Exact Date)   SpO2 97%   BMI 31.09 kg/m²  Estimated body mass index is 31.09 kg/m² as calculated from the following:    Height as of this encounter: 5' 2\" (1.575 m).    Weight as of this encounter: 170 lb (77.1 kg).     Wt Readings from Last 3 Encounters:   12/03/24 170 lb (77.1 kg)   08/22/24 170 lb (77.1 kg)   06/14/23 162 lb (73.5 kg)       Physical Exam  Vitals reviewed.   Constitutional:       General: She is not in acute distress.     Appearance: She is well-developed.   Cardiovascular:      Rate and Rhythm: Normal rate and regular rhythm.      Heart sounds: Normal heart sounds. No murmur heard.  Pulmonary:      Effort: Pulmonary effort is normal. No respiratory distress.      Breath sounds: Normal breath sounds.   Abdominal:      General: Bowel sounds are normal. There is no distension.      Palpations: Abdomen is soft.      Tenderness: There is no abdominal tenderness.   Musculoskeletal:      Lumbar back: Spasms, tenderness (left sided) and bony tenderness present. Normal range of motion. Negative right straight leg raise test and negative left straight leg raise test.      Left upper leg: Normal.      Right lower leg: No edema.      Left lower leg: No edema.   Neurological:      General: No focal deficit present.      Cranial Nerves: No cranial nerve deficit.      Sensory: No sensory deficit.      Motor: No weakness.      Coordination: Coordination normal.      Gait: Gait normal.             ASSESSMENT AND PLAN:   Patient is a 34 year old female who presents primarily presents for:    Diagnoses and all orders for this visit:    Acute left-sided low back pain with left-sided sciatica  -     methylPREDNISolone (MEDROL) 4 MG Oral Tablet Therapy Pack; As directed.  -     cyclobenzaprine 5 MG Oral Tab; Take 1 tablet (5 mg total) by mouth nightly as needed. Muscle spasm  -     Physical Therapy Referral - [facility] Loc[facility]  -     XR LUMBAR SPINE (MIN 2 VIEWS) (CPT=72100); Future       -no signs  of bone/disc etiology  -supportive care with heat, stretches, OTC pain meds discussed  -Discussed use of ice (acutely) vs heat (for chronic pain/spasms).   -If no improvement, will consider spine clinic  -work letter provided        Return if symptoms worsen or fail to improve.  Patient indicates understanding of the above recommendations and agrees to the above plan.  Reasurrance and education provided. All questions answered.  Notified to call with any questions, complications, allergies, or worsening or changing symptoms as well as any side effects or complications from the treatments .Red flags/ ER precautions discussed.    Spent 30 minutes including chart review, reviewing appropriate medical history, evaluating patient, discussing treatment options, counseling and education (diet and exercise), ordering appropriate diagnostic tests and completing documentation.        Meds & Refills for this Visit:  Requested Prescriptions     Signed Prescriptions Disp Refills    methylPREDNISolone (MEDROL) 4 MG Oral Tablet Therapy Pack 21 each 0     Sig: As directed.    cyclobenzaprine 5 MG Oral Tab 14 tablet 0     Sig: Take 1 tablet (5 mg total) by mouth nightly as needed. Muscle spasm       No orders of the defined types were placed in this encounter.      Imaging & Consults:  PHYSICAL THERAPY - INTERNAL    Health Maintenance:  Health Maintenance   Topic Date Due    COVID-19 Vaccine (3 - 2024-25 season) 09/01/2024    Influenza Vaccine (1) 10/01/2024    Annual Physical  08/22/2025    Pap Smear  11/17/2026    DTaP,Tdap,and Td Vaccines (3 - Td or Tdap) 04/12/2032    Annual Depression Screening  Completed    Pneumococcal Vaccine: Birth to 64yrs  Aged Out         Trinidad Hernandez MD                  [1] No Known Allergies

## 2024-12-11 ENCOUNTER — PATIENT MESSAGE (OUTPATIENT)
Dept: INTERNAL MEDICINE CLINIC | Facility: CLINIC | Age: 34
End: 2024-12-11

## 2024-12-17 ENCOUNTER — SPINE CENTER NAVIGATION (OUTPATIENT)
Age: 34
End: 2024-12-17

## 2024-12-17 NOTE — TELEPHONE ENCOUNTER
Responded to pt's MyChart message re: ongoing back pain despite cyclobenzaprine and Medrol dose pack, cold/ heat applications.  Pt does not appear to have yet scheduled the 2x/ wk x 4 wks of PT ordered by PCP.    Requested pt inform on current status and plan to attend PT sessions.  Await response.  ___________________  Referral to the Spine Center Navigator placed for pt today per Dr Del Rosario.

## 2024-12-17 NOTE — TELEPHONE ENCOUNTER
Responded to pt's MyChart message re: ongoing back pain despite cyclobenzaprine and Medrol dose pack, cold/ heat applications.  Pt does not appear to have yet scheduled the 2x/ wk x 4 wks of PT ordered by PCP.    Requested pt inform on current status and plan to attend PT sessions.  Await response.

## 2024-12-17 NOTE — PROGRESS NOTES
Spine Center Referral Navigation Encounter Note    Referred by: Trinidad Del Rosario MD    Imaging: XR Lumbar Spine   If imaging done at an external facility, instructed patient to bring disc of MRI to appointment.     Previously Seen Spine Care Providers: None    Information below is patient reported.     [facility]       Are you currently experiencing any of the following symptoms?  Select all that apply. No Enter a comment    Enter a comment      Is your condition due to an injury that occurred at work or is your care for this condition being coordinated by Worker's Compensation? Yes         12/17- Patient stated she has not been approved to see our specific specialities. Informed her that I will send her the information she needs to have authorized and to please reach out once this has been approved. She was agreeable to plan.    12/24- Per patient, her worker's compensation  stated she has to have physical therapy first and then be seen by one of their providers. She is unable to schedule with us at this time.    Spine Center Worker's Compensation Referral    Educated that there are referrals/paperwork needed in order to see a specialist using Worker's Compensation.     Do you have paperwork from your  with the next steps you should take for care of your injury? Yes    What is the next step in your care plan that you are approved for? Physical Therapy    Plan of Care:   Instructions send via [employer] for the specialties the spine center includes. Asked patient to call back if they get approval to be seen at [facility] by one of these specialties. Call back number provided.

## 2025-03-13 ENCOUNTER — PATIENT MESSAGE (OUTPATIENT)
Age: 35
End: 2025-03-13

## 2025-03-13 DIAGNOSIS — M54.42 ACUTE LEFT-SIDED LOW BACK PAIN WITH LEFT-SIDED SCIATICA: Primary | ICD-10-CM

## 2025-03-20 ENCOUNTER — NURSE TRIAGE (OUTPATIENT)
Age: 35
End: 2025-03-20

## 2025-03-20 NOTE — TELEPHONE ENCOUNTER
Patient will seek care at [facility] as she is already there.   Reason for Disposition   Severe earache pain    Answer Assessment - Initial Assessment Questions  1. LOCATION: \"Which ear is involved?\"      Right ear   2. ONSET: \"When did the ear start hurting\"       3/17/24  3. SEVERITY: \"How bad is the pain?\"  (Scale 1-10; mild, moderate or severe)    - MILD (1-3): doesn't interfere with normal activities     - MODERATE (4-7): interferes with normal activities or awakens from sleep     - SEVERE (8-10): excruciating pain, unable to do any normal activities       8/10  4. URI SYMPTOMS: \"Do you have a runny nose or cough?\"      Congestion/ sneezing/ runny nose   5. FEVER: \"Do you have a fever?\" If Yes, ask: \"What is your temperature, how was it measured, and when did it start?\"      No   6. CAUSE: \"Have you been swimming recently?\", \"How often do you use Q-TIPS?\", \"Have you had any recent air travel or scuba diving?\"      No   7. OTHER SYMPTOMS: \"Do you have any other symptoms?\" (e.g., headache, stiff neck, dizziness, vomiting, runny nose, decreased hearing)      Headache  8. PREGNANCY: \"Is there any chance you are pregnant?\" \"When was your last menstrual period?\"      3/5/25    Protocols used: Earache-A-[place]

## 2025-03-20 NOTE — TELEPHONE ENCOUNTER
Pt called stating that she has been having ear pain for the past 3 days, that she had sinus infection last week and then ear pain started  Pt has been using drops for her ear but does not help    Pt would like to be seen today     Please advise

## 2025-03-31 ENCOUNTER — PATIENT MESSAGE (OUTPATIENT)
Age: 35
End: 2025-03-31

## 2025-04-04 NOTE — TELEPHONE ENCOUNTER
Spoke with Naa inquired if Dr. Del Rosario received MRI L-S imaging results which was done on 3/26/25 by [employer] results. She has inquired on 3/31/25, but she did not receive a response.     RN sent secure [email or web address] chat to Dr. Sweet's to U[facility] CARRASCO inquiry if their office has received the MRI reading?

## 2025-04-10 ENCOUNTER — MED REC SCAN ONLY (OUTPATIENT)
Age: 35
End: 2025-04-10

## 2025-05-31 ENCOUNTER — MED REC SCAN ONLY (OUTPATIENT)
Age: 35
End: 2025-05-31

## (undated) DEVICE — LIGACLIP 10-M/L, 10MM ENDOSCOPIC ROTATING MULTIPLE CLIP APPLIERS: Brand: LIGACLIP

## (undated) DEVICE — ENDOAVITENE MICROFIBRILLAR COLLAGEN HEMOSTAT IN AN ENDOSCOPIC DELIVERY SYSTEM: Brand: ENDOAVITENE

## (undated) DEVICE — 20 ML SYRINGE LUER-LOCK TIP: Brand: MONOJECT

## (undated) DEVICE — SUT MONOCRYL 4-0 PS-2 Y496G

## (undated) DEVICE — TROCAR: Brand: KII FIOS FIRST ENTRY

## (undated) DEVICE — SOL NACL IRRIG 0.9% 1000ML BTL

## (undated) DEVICE — 9534HP TRANSPARENT DRSG W/FRAME: Brand: 3M™ TEGADERM™

## (undated) DEVICE — DERMABOND CLOSURE 0.7ML TOPICL

## (undated) DEVICE — LAP CHOLE: Brand: MEDLINE INDUSTRIES, INC.

## (undated) DEVICE — ENCORE® LATEX MICRO SIZE 8.5, STERILE LATEX POWDER-FREE SURGICAL GLOVE: Brand: ENCORE

## (undated) DEVICE — TROCAR: Brand: KII® SLEEVE

## (undated) DEVICE — INTROCAN SAFETY® IV CATHETER 14 GA. X 2 IN., FEP, WINGED: Brand: INTROCAN SAFETY®

## (undated) DEVICE — CATH CHLGM 18IN 4.5FR PP

## (undated) DEVICE — APPLIER CLIP ENDO 10MM

## (undated) DEVICE — SUT VICRYL 0 UR-5 J376H

## (undated) DEVICE — SOL  0.9% 1000ML

## (undated) DEVICE — ANCHOR TISSUE RETRIEVAL SYSTEM, BAG SIZE 175 ML, PORT SIZE 10 MM: Brand: ANCHOR TISSUE RETRIEVAL SYSTEM

## (undated) DEVICE — [HIGH FLOW INSUFFLATOR,  DO NOT USE IF PACKAGE IS DAMAGED,  KEEP DRY,  KEEP AWAY FROM SUNLIGHT,  PROTECT FROM HEAT AND RADIOACTIVE SOURCES.]: Brand: PNEUMOSURE

## (undated) DEVICE — CAUTERY ENDO L HOOK EZ-CLEAN

## (undated) DEVICE — STERILE WATER 1000ML BTL

## (undated) DEVICE — PDS II VLT 0 107CM AG ST3: Brand: ENDOLOOP

## (undated) DEVICE — LAPAROVUE VISIBILITY SYSTEM LAPAROSCOPIC SOLUTIONS: Brand: LAPAROVUE

## (undated) NOTE — LETTER
[facility]  [address]  [address] [place] [address]  Dept: [phone]  Loc: [phone]         Yenny 10, 2023    Patient: Ryan Driscoll   YOB: 1990   Date of Visit: 6/9/2023       To Whom It May Concern:    Please excuse Naa Laureano from work because she was hospitalized in [facility] on 6/9-6/10/2023, and needs to recuperate at home. She will be evaluated by MD the next week before able to return to work. If you have any questions or concerns, please don't hesitate to call.       Encounter Provider(s):    MD Arjun Morfin MD Clearence Press, MD     2:02 PM  6/10/2023

## (undated) NOTE — LETTER
Date & Time: 3/4/2024, 9:48 AM  Patient: Naa Barnard  Encounter Provider(s):    Ying Chen APRN       To Whom It May Concern:    Naa Barnard was seen and treated in our department on 3/4/2024. She should not return to work until fever free for 24 hours .    If you have any questions or concerns, please do not hesitate to call.        _____________________________  Physician/APC Signature

## (undated) NOTE — LETTER
Date: 3/20/2018    Patient Name: Ricardo Willard          To Whom it may concern: This letter has been written at the patient's request. The above patient was seen at the [facility] for treatment of a medical condition.     This patient pratibhau

## (undated) NOTE — LETTER
Date: 12/3/2024    Patient Name: Naa Barnard          To Whom it may concern:    The above patient was seen at [facility] for treatment of a medical condition.    Please provide the following accomodations until 1/1/2025: no transferring or lifting patients.        Sincerely,          Trinidad Hernandez MD

## (undated) NOTE — LETTER
Date: 7/15/2019    Patient Name: Sher Reed          To Whom it may concern: This letter has been written at the patient's request. The above patient was seen at the [facility] for treatment of a medical condition.     This patient pratibhau

## (undated) NOTE — LETTER
Date: 10/3/2019    Patient Name: Tori Rodriguez          To Whom it may concern: This letter has been written at the patient's request. The above patient was seen at the [facility] for treatment of a medical condition.     This patient pratibhau

## (undated) NOTE — LETTER
Date: 1/11/2022    Patient Name: Cielo Lozoya          To Whom it may concern: This letter has been written at the patient's request. The above patient was seen at the [facility] for treatment of a medical condition.     This patient sh

## (undated) NOTE — LETTER
[address] [facility] [address] [place], [place]  Authorization for Surgical Operation and Procedure                                                                                           I hereby authorize Radha Pereira MD, my physician and his/her assistants (if applicable), which may include medical students, residents, and/or fellows, to perform the following surgical operation/ procedure and administer such anesthesia as may be determined necessary by my physician: Operation/Procedure name (s) LAPAROSCOPIC CHOLECYSTECTOMY possible open  possible intra-operative cholangiogram possible common duct exploration on Naagabi Barnard   2. I recognize that during the surgical operation/procedure, unforeseen conditions may necessitate additional or different procedures than those listed above. I, therefore, further authorize and request that the above-named surgeon, assistants, or designees perform such procedures as are, in their judgment, necessary and desirable. 3.   My surgeon/physician has discussed prior to my surgery the potential benefits, risks and side effects of this procedure; the likelihood of achieving goals; and potential problems that might occur during recuperation. They also discussed reasonable alternatives to the procedure, including risks, benefits, and side effects related to the alternatives and risks related to not receiving this procedure. I have had all my questions answered and I acknowledge that no guarantee has been made as to the result that may be obtained. 4.   Should the need arise during my operation/procedure, which includes change of level of care prior to discharge, I also consent to the administration of blood and/or blood products. Further, I understand that despite careful testing and screening of blood or blood products by collecting agencies, I may still be subject to ill effects as a result of receiving a blood transfusion and/or blood products. The following are some, but not all, of the potential risks that can occur: fever and allergic reactions, hemolytic reactions, transmission of diseases such as Hepatitis, AIDS and Cytomegalovirus (CMV) and fluid overload. In the event that I wish to have an autologous transfusion of my own blood, or a directed donor transfusion, I will discuss this with my physician. Check only if Refusing Blood or Blood Products  I understand refusal of blood or blood products as deemed necessary by my physician may have serious consequences to my condition to include possible death. I hereby assume responsibility for my refusal and release the hospital, its personnel, and my physicians from any responsibility for the consequences of my refusal.    o  Refuse   5. I authorize the use of any specimen, organs, tissues, body parts or foreign objects that may be removed from my body during the operation/procedure for diagnosis, research or teaching purposes and their subsequent disposal by hospital authorities. I also authorize the release of specimen test results and/or written reports to my treating physician on the hospital medical staff or other referring or consulting physicians involved in my care, at the discretion of the Pathologist or my treating physician. 6.   I consent to the photographing or videotaping of the operations or procedures to be performed, including appropriate portions of my body for medical, scientific, or educational purposes, provided my identity is not revealed by the pictures or by descriptive texts accompanying them. If the procedure has been photographed/videotaped, the surgeon will obtain the original picture, image, videotape or CD. The hospital will not be responsible for storage, release or maintenance of the picture, image, tape or CD.    7.   I consent to the presence of a  or observers in the operating room as deemed necessary by my physician or their designees.     8. I recognize that in the event my procedure results in extended X-Ray/fluoroscopy time, I may develop a skin reaction. 9. If I have a Do Not Attempt Resuscitation (DNAR) order in place, that status will be suspended while in the operating room, procedural suite, and during the recovery period unless otherwise explicitly stated by me (or a person authorized to consent on my behalf). The surgeon or my attending physician will determine when the applicable recovery period ends for purposes of reinstating the DNAR order. 10. Patients having a sterilization procedure: I understand that if the procedure is successful the results will be permanent and it will therefore be impossible for me to inseminate, conceive, or bear children. I also understand that the procedure is intended to result in sterility, although the result has not been guaranteed. 11. I acknowledge that my physician has explained sedation/analgesia administration to me including the risk and benefits I consent to the administration of sedation/analgesia as may be necessary or desirable in the judgment of my physician. I CERTIFY THAT I HAVE READ AND FULLY UNDERSTAND THE ABOVE CONSENT TO OPERATION and/or OTHER PROCEDURE.     _________________________________________ _________________________________     ___________________________________  Signature of Patient     Signature of Responsible Person                   Printed Name of Responsible Person                              _________________________________________ ______________________________        ___________________________________  Signature of Witness         Date  Time         Relationship to Patient    STATEMENT OF PHYSICIAN My signature below affirms that prior to the time of the procedure; I have explained to the patient and/or his/her legal representative, the risks and benefits involved in the proposed treatment and any reasonable alternative to the proposed treatment.  I have also explained the risks and benefits involved in refusal of the proposed treatment and alternatives to the proposed treatment and have answered the patient's questions.  If I have a significant financial interest in a co-management agreement or a significant financial interest in any product or implant, or other significant relationship used in this procedure/surgery, I have disclosed this and had a discussion with my patient.     _______________________________________________________________ _____________________________  Rabia Damari of Physician)                                                                                         (Date)                                   (Time)  Patient Name: Caryle Bolognese    : 1990   Printed: 2023      Medical Record #: [id number]                                              Page 1 of 1